# Patient Record
Sex: FEMALE | Race: WHITE | NOT HISPANIC OR LATINO | Employment: FULL TIME | URBAN - NONMETROPOLITAN AREA
[De-identification: names, ages, dates, MRNs, and addresses within clinical notes are randomized per-mention and may not be internally consistent; named-entity substitution may affect disease eponyms.]

---

## 2017-02-06 RX ORDER — ATENOLOL AND CHLORTHALIDONE TABLET 50; 25 MG/1; MG/1
1 TABLET ORAL DAILY
Qty: 7 TABLET | Refills: 0 | Status: SHIPPED | OUTPATIENT
Start: 2017-02-06 | End: 2017-02-15 | Stop reason: SDUPTHER

## 2017-02-06 RX ORDER — AMLODIPINE BESYLATE 10 MG/1
10 TABLET ORAL DAILY
Qty: 7 TABLET | Refills: 0 | Status: SHIPPED | OUTPATIENT
Start: 2017-02-06 | End: 2017-02-15 | Stop reason: SDUPTHER

## 2017-02-08 ENCOUNTER — OFFICE VISIT (OUTPATIENT)
Dept: INTERNAL MEDICINE | Facility: CLINIC | Age: 42
End: 2017-02-08

## 2017-02-08 VITALS
HEIGHT: 69 IN | WEIGHT: 285 LBS | SYSTOLIC BLOOD PRESSURE: 110 MMHG | BODY MASS INDEX: 42.21 KG/M2 | DIASTOLIC BLOOD PRESSURE: 80 MMHG

## 2017-02-08 DIAGNOSIS — E66.01 MORBID OBESITY DUE TO EXCESS CALORIES (HCC): ICD-10-CM

## 2017-02-08 DIAGNOSIS — I10 ESSENTIAL HYPERTENSION: Primary | ICD-10-CM

## 2017-02-08 PROCEDURE — 99213 OFFICE O/P EST LOW 20 MIN: CPT | Performed by: INTERNAL MEDICINE

## 2017-02-08 NOTE — PROGRESS NOTES
Subjective   Marlee Ayala is a 42 y.o. female       Her BP is usually controlled on Norvasc and Atenolol.  She denies any cardiovascular complaints.  Patient is compliant with her medications and takes them as prescribed.  She does not exercise on regular basis.  Patient was unable to lose any weight since last visit.  She would like to try weight loss medication.    Past Medical History   Diagnosis Date   • Abdominal pain      unspecified   • History of Papanicolaou smear of cervix 2005     postpartum satisfactory   • Hypertension    • Obesity      Past Surgical History   Procedure Laterality Date   • Cholecystectomy     • Dilatation and curettage  2000     incomplete        Social History   Substance Use Topics   • Smoking status: Never Smoker   • Smokeless tobacco: Not on file   • Alcohol use Not on file     Family History   Problem Relation Age of Onset   • Diabetes Other    • Hypertension Other    • Prostate cancer Other      No Known Allergies  Current Outpatient Prescriptions on File Prior to Visit   Medication Sig Dispense Refill   • amLODIPine (NORVASC) 10 MG tablet Take 1 tablet by mouth Daily. 7 tablet 0   • atenolol-chlorthalidone (TENORETIC) 50-25 MG per tablet Take 1 tablet by mouth Daily. 7 tablet 0   • [DISCONTINUED] LORazepam (ATIVAN) 0.5 MG tablet        No current facility-administered medications on file prior to visit.      Review of Systems   Constitutional: Negative for fatigue.   Respiratory: Negative for shortness of breath.    Cardiovascular: Negative for chest pain, palpitations and leg swelling.   Gastrointestinal: Negative for abdominal pain, constipation and diarrhea.   Endocrine: Negative for cold intolerance, heat intolerance, polydipsia and polyuria.   Genitourinary: Negative for flank pain and frequency.   Psychiatric/Behavioral: Negative for sleep disturbance. The patient is not nervous/anxious.        Objective   Physical Exam   Constitutional: She appears  well-developed and well-nourished.   HENT:   Head: Normocephalic and atraumatic.   Nose: Nose normal.   Mouth/Throat: Oropharynx is clear and moist.   Eyes: Conjunctivae and EOM are normal.   Neck: Neck supple. No JVD present. No thyromegaly present.   Cardiovascular: Normal rate and regular rhythm.    No murmur heard.  Pulmonary/Chest: Effort normal and breath sounds normal.   Abdominal: Soft. She exhibits no mass.   Musculoskeletal: Normal range of motion.   Psychiatric: She has a normal mood and affect. Her behavior is normal.           Patient Active Problem List   Diagnosis   • Essential hypertension   • Morbid obesity due to excess calories       No visits with results within 6 Month(s) from this visit.  Latest known visit with results is:    Hospital Outpatient Visit on 07/06/2016   Component Date Value Ref Range Status   • Sodium 07/06/2016 139  137 - 145 mmol/L Final   • Potassium 07/06/2016 3.5  3.5 - 5.1 mmol/L Final   • Chloride 07/06/2016 99  95 - 110 mmol/L Final   • CO2 07/06/2016 27  22 - 31 mmol/L Final   • Anion Gap 07/06/2016 13.0  5.0 - 15.0 mmol/L Final   • Glucose 07/06/2016 112* 60 - 100 mg/dl Final   • BUN 07/06/2016 13  7 - 21 mg/dl Final   • Creatinine 07/06/2016 0.6  0.5 - 1.0 mg/dl Final   • GFR MDRD Non  07/06/2016 111  58 - 135 mL/min/1.73 sq.M Final   • GFR MDRD  07/06/2016 134  58 - 135 mL/min/1.73 sq.M Final   • Calcium 07/06/2016 9.1  8.4 - 10.2 mg/dl Final   • Total Protein 07/06/2016 7.2  6.3 - 8.6 gm/dl Final   • Albumin 07/06/2016 3.9  3.4 - 4.8 gm/dl Final   • Total Bilirubin 07/06/2016 0.4  0.2 - 1.3 mg/dl Final   • Alkaline Phosphatase 07/06/2016 50  38 - 126 U/L Final   • AST (SGOT) 07/06/2016 24  14 - 36 U/L Final   • ALT (SGPT) 07/06/2016 43  9 - 52 U/L Final   • Total Cholesterol 07/06/2016 189  0 - 199 mg/dl Final   • Triglycerides 07/06/2016 139  20 - 199 mg/dl Final   • HDL Cholesterol 07/06/2016 40* 60 - 200 mg/dl Final   • LDL  Cholesterol  07/06/2016 121  0 - 129 mg/dl Final   • Hemoglobin A1C 07/06/2016 5.5  4.0 - 5.6 %TotHgb Final             Assessment    Diagnosis Plan   1. Essential hypertension     2. Morbid obesity due to excess calories         Plan      1.  She will continue current therapy with Norvasc and Atenolol.       DASH.      1.5 gr Sodium restriction.  2.  Calories restricted diet discussed with the patient.       Advised to increase physical activity to 30 minutes a day.       She will be tried on Belviq.       Side effects of the medication reviewed with the patient.          Follow up in 1 month

## 2017-02-15 RX ORDER — ATENOLOL AND CHLORTHALIDONE TABLET 50; 25 MG/1; MG/1
1 TABLET ORAL DAILY
Qty: 30 TABLET | Refills: 5 | Status: SHIPPED | OUTPATIENT
Start: 2017-02-15 | End: 2017-09-29 | Stop reason: SDUPTHER

## 2017-02-15 RX ORDER — AMLODIPINE BESYLATE 10 MG/1
10 TABLET ORAL DAILY
Qty: 30 TABLET | Refills: 5 | Status: SHIPPED | OUTPATIENT
Start: 2017-02-15 | End: 2017-09-29 | Stop reason: SDUPTHER

## 2017-09-29 ENCOUNTER — TELEPHONE (OUTPATIENT)
Dept: INTERNAL MEDICINE | Facility: CLINIC | Age: 42
End: 2017-09-29

## 2017-09-29 RX ORDER — ATENOLOL AND CHLORTHALIDONE TABLET 50; 25 MG/1; MG/1
1 TABLET ORAL DAILY
Qty: 30 TABLET | Refills: 0 | Status: SHIPPED | OUTPATIENT
Start: 2017-09-29 | End: 2017-10-23 | Stop reason: SDUPTHER

## 2017-09-29 RX ORDER — AMLODIPINE BESYLATE 10 MG/1
10 TABLET ORAL DAILY
Qty: 30 TABLET | Refills: 0 | Status: SHIPPED | OUTPATIENT
Start: 2017-09-29 | End: 2017-10-23 | Stop reason: SDUPTHER

## 2017-09-29 NOTE — TELEPHONE ENCOUNTER
PATIENT IS WONDERING IF SHE CAN GET ENOUGH MEDICATION TO GET HER THROUGH UNTIL HER APPOINTMENT SHE IS OUT OF MEDICATION. SHE USES [facility]

## 2017-10-23 ENCOUNTER — OFFICE VISIT (OUTPATIENT)
Dept: INTERNAL MEDICINE | Facility: CLINIC | Age: 42
End: 2017-10-23

## 2017-10-23 VITALS
DIASTOLIC BLOOD PRESSURE: 80 MMHG | BODY MASS INDEX: 42 KG/M2 | SYSTOLIC BLOOD PRESSURE: 120 MMHG | WEIGHT: 283.6 LBS | HEIGHT: 69 IN

## 2017-10-23 DIAGNOSIS — I10 ESSENTIAL HYPERTENSION: Primary | ICD-10-CM

## 2017-10-23 DIAGNOSIS — E66.01 MORBID OBESITY WITH BMI OF 40.0-44.9, ADULT (HCC): ICD-10-CM

## 2017-10-23 PROCEDURE — 99213 OFFICE O/P EST LOW 20 MIN: CPT | Performed by: INTERNAL MEDICINE

## 2017-10-23 RX ORDER — ATENOLOL AND CHLORTHALIDONE TABLET 50; 25 MG/1; MG/1
1 TABLET ORAL DAILY
Qty: 30 TABLET | Refills: 5 | Status: SHIPPED | OUTPATIENT
Start: 2017-10-23 | End: 2018-05-21 | Stop reason: SDUPTHER

## 2017-10-23 RX ORDER — AMLODIPINE BESYLATE 10 MG/1
10 TABLET ORAL DAILY
Qty: 30 TABLET | Refills: 5 | Status: SHIPPED | OUTPATIENT
Start: 2017-10-23 | End: 2018-05-21 | Stop reason: SDUPTHER

## 2017-10-23 NOTE — PROGRESS NOTES
Subjective   Marlee Ayala is a 42 y.o. female       Patient is in for recheck on HTN and obesity.    Her BP is well controlled on Norvasc and Atenolol.  She checks BP at work and it is in normal range.  She denies any chest pain, dyspnea or edema in lower extremities.  She was unable to lose any weight since last visit.  Patient was given prescription for Belviq last visit but has not had it filled. She would like to try it this time.    Past Medical History:   Diagnosis Date   • Abdominal pain     unspecified   • History of Papanicolaou smear of cervix 2005    postpartum satisfactory   • Hypertension    • Obesity      Past Surgical History:   Procedure Laterality Date   • CHOLECYSTECTOMY     • DILATATION AND CURETTAGE  2000    incomplete        Social History   Substance Use Topics   • Smoking status: Never Smoker   • Smokeless tobacco: Never Used   • Alcohol use Not on file     Family History   Problem Relation Age of Onset   • Diabetes Other    • Hypertension Other    • Prostate cancer Other      No Known Allergies  Current Outpatient Prescriptions on File Prior to Visit   Medication Sig Dispense Refill   • [DISCONTINUED] amLODIPine (NORVASC) 10 MG tablet Take 1 tablet by mouth Daily. 30 tablet 0   • [DISCONTINUED] atenolol-chlorthalidone (TENORETIC) 50-25 MG per tablet Take 1 tablet by mouth Daily. 30 tablet 0     No current facility-administered medications on file prior to visit.      Review of Systems   Constitutional: Negative for activity change and fatigue.   Respiratory: Negative for chest tightness and shortness of breath.    Gastrointestinal: Negative for abdominal pain, constipation and diarrhea.   Endocrine: Negative for cold intolerance, heat intolerance, polydipsia and polyuria.   Genitourinary: Negative for flank pain and frequency.   Musculoskeletal: Negative for gait problem and joint swelling.   Neurological: Negative for dizziness, light-headedness and headaches.    Psychiatric/Behavioral: Negative for sleep disturbance. The patient is not nervous/anxious.        Objective   Physical Exam   Constitutional: She appears well-developed and well-nourished.   HENT:   Head: Normocephalic and atraumatic.   Eyes: Conjunctivae and EOM are normal. Pupils are equal, round, and reactive to light.   Neck: Neck supple. No JVD present. No thyromegaly present.   Cardiovascular: Normal rate and regular rhythm.    No murmur heard.  Pulmonary/Chest: Effort normal and breath sounds normal.   Abdominal: Soft. Bowel sounds are normal. She exhibits no mass.   Musculoskeletal: Normal range of motion. She exhibits no tenderness.   Neurological: She is alert.   Skin: Skin is warm and dry.   Psychiatric: She has a normal mood and affect. Her behavior is normal.   Nursing note and vitals reviewed.          Patient Active Problem List   Diagnosis   • Essential hypertension   • Morbid obesity due to excess calories               Assessment   Diagnosis Plan   1. Essential hypertension     2. Morbid obesity with BMI of 40.0-44.9, adult  Lipid Panel    Comprehensive Metabolic Panel    Hemoglobin A1c    TSH       Plan        1. Patient will continue Norvasc and Atenolol.      DASH.      1.5 gr Sodium restriction.  2. Patient is counseled on calories restricted diet.      Advised to increase physical activity to 30 minutes 5 days a week.      Patient would like to try Belviq.      Medication benefits and side effects discussed with the patient.      Follow up in 3 months.          This document has been electronically signed by Kalpana Davenport MD on October 23, 2017 4:23 PM

## 2017-10-23 NOTE — PATIENT INSTRUCTIONS
Calorie Counting for Weight Loss  Calories are energy you get from the things you eat and drink. Your body uses this energy to keep you going throughout the day. The number of calories you eat affects your weight. When you eat more calories than your body needs, your body stores the extra calories as fat. When you eat fewer calories than your body needs, your body burns fat to get the energy it needs.  Calorie counting means keeping track of how many calories you eat and drink each day. If you make sure to eat fewer calories than your body needs, you should lose weight. In order for calorie counting to work, you will need to eat the number of calories that are right for you in a day to lose a healthy amount of weight per week. A healthy amount of weight to lose per week is usually 1-2 lb (0.5-0.9 kg). A dietitian can determine how many calories you need in a day and give you suggestions on how to reach your calorie goal.   WHAT IS MY MY PLAN?  My goal is to have __________ calories per day.   If I have this many calories per day, I should lose around __________ pounds per week.  WHAT DO I NEED TO KNOW ABOUT CALORIE COUNTING?  In order to meet your daily calorie goal, you will need to:  · Find out how many calories are in each food you would like to eat. Try to do this before you eat.  · Decide how much of the food you can eat.  · Write down what you ate and how many calories it had. Doing this is called keeping a food log.  WHERE DO I FIND CALORIE INFORMATION?  The number of calories in a food can be found on a Nutrition Facts label. Note that all the information on a label is based on a specific serving of the food. If a food does not have a Nutrition Facts label, try to look up the calories online or ask your dietitian for help.  HOW DO I DECIDE HOW MUCH TO EAT?  To decide how much of the food you can eat, you will need to consider both the number of calories in one serving and the size of one serving. This  information can be found on the Nutrition Facts label. If a food does not have a Nutrition Facts label, look up the information online or ask your dietitian for help.  Remember that calories are listed per serving. If you choose to have more than one serving of a food, you will have to multiply the calories per serving by the amount of servings you plan to eat. For example, the label on a package of bread might say that a serving size is 1 slice and that there are 90 calories in a serving. If you eat 1 slice, you will have eaten 90 calories. If you eat 2 slices, you will have eaten 180 calories.  HOW DO I KEEP A FOOD LOG?  After each meal, record the following information in your food log:  · What you ate.  · How much of it you ate.  · How many calories it had.  · Then, add up your calories.  Keep your food log near you, such as in a small notebook in your pocket. Another option is to use a mobile lelo or website. Some programs will calculate calories for you and show you how many calories you have left each time you add an item to the log.  WHAT ARE SOME CALORIE COUNTING TIPS?  · Use your calories on foods and drinks that will fill you up and not leave you hungry. Some examples of this include foods like nuts and nut butters, vegetables, lean proteins, and high-fiber foods (more than 5 g fiber per serving).  · Eat nutritious foods and avoid empty calories. Empty calories are calories you get from foods or beverages that do not have many nutrients, such as candy and soda. It is better to have a nutritious high-calorie food (such as an avocado) than a food with few nutrients (such as a bag of chips).  · Know how many calories are in the foods you eat most often. This way, you do not have to look up how many calories they have each time you eat them.  · Look out for foods that may seem like low-calorie foods but are really high-calorie foods, such as baked goods, soda, and fat-free candy.  · Pay attention to calories  in drinks. Drinks such as sodas, specialty coffee drinks, alcohol, and juices have a lot of calories yet do not fill you up. Choose low-calorie drinks like water and diet drinks.  · Focus your calorie counting efforts on higher calorie items. Logging the calories in a garden salad that contains only vegetables is less important than calculating the calories in a milk shake.  · Find a way of tracking calories that works for you. Get creative. Most people who are successful find ways to keep track of how much they eat in a day, even if they do not count every calorie.  WHAT ARE SOME PORTION CONTROL TIPS?  · Know how many calories are in a serving. This will help you know how many servings of a certain food you can have.  · Use a measuring cup to measure serving sizes. This is helpful when you start out. With time, you will be able to estimate serving sizes for some foods.  · Take some time to put servings of different foods on your favorite plates, bowls, and cups so you know what a serving looks like.  · Try not to eat straight from a bag or box. Doing this can lead to overeating. Put the amount you would like to eat in a cup or on a plate to make sure you are eating the right portion.  · Use smaller plates, glasses, and bowls to prevent overeating. This is a quick and easy way to practice portion control. If your plate is smaller, less food can fit on it.  · Try not to multitask while eating, such as watching TV or using your computer. If it is time to eat, sit down at a table and enjoy your food. Doing this will help you to start recognizing when you are full. It will also make you more aware of what and how much you are eating.  HOW CAN I CALORIE COUNT WHEN EATING OUT?  · Ask for smaller portion sizes or child-sized portions.  · Consider sharing an entree and sides instead of getting your own entree.  · If you get your own entree, eat only half. Ask for a box at the beginning of your meal and put the rest of your  "entree in it so you are not tempted to eat it.  · Look for the calories on the menu. If calories are listed, choose the lower calorie options.  · Choose dishes that include vegetables, fruits, whole grains, low-fat dairy products, and lean protein. Focusing on smart food choices from each of the 5 food groups can help you stay on track at restaurants.  · Choose items that are boiled, broiled, grilled, or steamed.  · Choose water, milk, unsweetened iced tea, or other drinks without added sugars. If you want an alcoholic beverage, choose a lower calorie option. For example, a regular bucky can have up to 700 calories and a glass of wine has around 150.  · Stay away from items that are buttered, battered, fried, or served with cream sauce. Items labeled \"crispy\" are usually fried, unless stated otherwise.  · Ask for dressings, sauces, and syrups on the side. These are usually very high in calories, so do not eat much of them.  · Watch out for salads. Many people think salads are a healthy option, but this is often not the case. Many salads come with chapin, fried chicken, lots of cheese, fried chips, and dressing. All of these items have a lot of calories. If you want a salad, choose a garden salad and ask for grilled meats or steak. Ask for the dressing on the side, or ask for olive oil and vinegar or lemon to use as dressing.  · Estimate how many servings of a food you are given. For example, a serving of cooked rice is ½ cup or about the size of half a tennis ball or one cupcake wrapper. Knowing serving sizes will help you be aware of how much food you are eating at restaurants. The list below tells you how big or small some common portion sizes are based on everyday objects.    1 oz--4 stacked dice.    3 oz--1 deck of cards.    1 tsp--1 dice.    1 Tbsp--½ a Ping-Pong ball.    2 Tbsp--1 Ping-Pong ball.    ½ cup--1 tennis ball or 1 cupcake wrapper.    1 cup--1 baseball.     This information is not intended to " replace advice given to you by your health care provider. Make sure you discuss any questions you have with your health care provider.     Document Released: 12/18/2006 Document Revised: 01/08/2016 Document Reviewed: 10/23/2014  [employer] Interactive Patient Education ©2017 [employer]

## 2018-05-21 ENCOUNTER — TELEPHONE (OUTPATIENT)
Dept: INTERNAL MEDICINE | Facility: CLINIC | Age: 43
End: 2018-05-21

## 2018-05-21 RX ORDER — AMLODIPINE BESYLATE 10 MG/1
10 TABLET ORAL DAILY
Qty: 2 TABLET | Refills: 0 | Status: SHIPPED | OUTPATIENT
Start: 2018-05-21 | End: 2018-05-22 | Stop reason: SDUPTHER

## 2018-05-21 RX ORDER — ATENOLOL AND CHLORTHALIDONE TABLET 50; 25 MG/1; MG/1
1 TABLET ORAL DAILY
Qty: 2 TABLET | Refills: 0 | Status: SHIPPED | OUTPATIENT
Start: 2018-05-21 | End: 2018-05-22 | Stop reason: SDUPTHER

## 2018-05-22 ENCOUNTER — OFFICE VISIT (OUTPATIENT)
Dept: INTERNAL MEDICINE | Facility: CLINIC | Age: 43
End: 2018-05-22

## 2018-05-22 VITALS
SYSTOLIC BLOOD PRESSURE: 120 MMHG | DIASTOLIC BLOOD PRESSURE: 80 MMHG | BODY MASS INDEX: 42.43 KG/M2 | HEIGHT: 69 IN | WEIGHT: 286.5 LBS

## 2018-05-22 DIAGNOSIS — E66.01 OBESITY, CLASS III, BMI 40-49.9 (MORBID OBESITY) (HCC): ICD-10-CM

## 2018-05-22 DIAGNOSIS — I10 ESSENTIAL HYPERTENSION: Primary | ICD-10-CM

## 2018-05-22 PROCEDURE — 99213 OFFICE O/P EST LOW 20 MIN: CPT | Performed by: INTERNAL MEDICINE

## 2018-05-22 RX ORDER — ATENOLOL AND CHLORTHALIDONE TABLET 50; 25 MG/1; MG/1
1 TABLET ORAL DAILY
Qty: 90 TABLET | Refills: 1 | Status: SHIPPED | OUTPATIENT
Start: 2018-05-22 | End: 2018-08-21 | Stop reason: SDUPTHER

## 2018-05-22 RX ORDER — AMLODIPINE BESYLATE 10 MG/1
10 TABLET ORAL DAILY
Qty: 90 TABLET | Refills: 1 | Status: SHIPPED | OUTPATIENT
Start: 2018-05-22 | End: 2018-08-21 | Stop reason: SDUPTHER

## 2018-08-17 ENCOUNTER — TELEPHONE (OUTPATIENT)
Dept: FAMILY MEDICINE CLINIC | Facility: CLINIC | Age: 43
End: 2018-08-17

## 2018-08-17 ENCOUNTER — APPOINTMENT (OUTPATIENT)
Dept: LAB | Facility: HOSPITAL | Age: 43
End: 2018-08-17

## 2018-08-17 DIAGNOSIS — E87.6 HYPOKALEMIA: Primary | ICD-10-CM

## 2018-08-17 LAB
ALBUMIN SERPL-MCNC: 4.1 G/DL (ref 3.4–4.8)
ALBUMIN/GLOB SERPL: 1.1 G/DL (ref 1.1–1.8)
ALP SERPL-CCNC: 48 U/L (ref 38–126)
ALT SERPL W P-5'-P-CCNC: 51 U/L (ref 9–52)
ANION GAP SERPL CALCULATED.3IONS-SCNC: 9 MMOL/L (ref 5–15)
AST SERPL-CCNC: 38 U/L (ref 14–36)
BILIRUB SERPL-MCNC: 0.6 MG/DL (ref 0.2–1.3)
BUN BLD-MCNC: 17 MG/DL (ref 7–21)
BUN/CREAT SERPL: 19.5 (ref 7–25)
CALCIUM SPEC-SCNC: 9.1 MG/DL (ref 8.4–10.2)
CHLORIDE SERPL-SCNC: 97 MMOL/L (ref 95–110)
CO2 SERPL-SCNC: 32 MMOL/L (ref 22–31)
CREAT BLD-MCNC: 0.87 MG/DL (ref 0.5–1)
GFR SERPL CREATININE-BSD FRML MDRD: 71 ML/MIN/1.73 (ref 58–135)
GLOBULIN UR ELPH-MCNC: 3.9 GM/DL (ref 2.3–3.5)
GLUCOSE BLD-MCNC: 123 MG/DL (ref 60–100)
HBA1C MFR BLD: 5.6 % (ref 4–5.6)
POTASSIUM BLD-SCNC: 2.9 MMOL/L (ref 3.5–5.1)
PROT SERPL-MCNC: 8 G/DL (ref 6.3–8.6)
SODIUM BLD-SCNC: 138 MMOL/L (ref 137–145)
TSH SERPL DL<=0.05 MIU/L-ACNC: 0.92 MIU/ML (ref 0.46–4.68)

## 2018-08-17 PROCEDURE — 83036 HEMOGLOBIN GLYCOSYLATED A1C: CPT | Performed by: INTERNAL MEDICINE

## 2018-08-17 PROCEDURE — 86038 ANTINUCLEAR ANTIBODIES: CPT | Performed by: EMERGENCY MEDICINE

## 2018-08-17 PROCEDURE — 85025 COMPLETE CBC W/AUTO DIFF WBC: CPT | Performed by: EMERGENCY MEDICINE

## 2018-08-17 PROCEDURE — 86140 C-REACTIVE PROTEIN: CPT | Performed by: INTERNAL MEDICINE

## 2018-08-17 PROCEDURE — 84443 ASSAY THYROID STIM HORMONE: CPT | Performed by: INTERNAL MEDICINE

## 2018-08-17 PROCEDURE — 80053 COMPREHEN METABOLIC PANEL: CPT | Performed by: INTERNAL MEDICINE

## 2018-08-17 RX ORDER — POTASSIUM CHLORIDE 1500 MG/1
TABLET, FILM COATED, EXTENDED RELEASE ORAL
Qty: 30 TABLET | Refills: 1 | Status: SHIPPED | OUTPATIENT
Start: 2018-08-17 | End: 2018-08-31 | Stop reason: SDUPTHER

## 2018-08-17 NOTE — TELEPHONE ENCOUNTER
-Patient States Dr. Davenport has already called her and her  is on his way to  the Potassium Script.      ---- Message from Kalpana Davenport MD sent at 8/17/2018  2:00 PM CDT -----  Potassium is vey low  Start kcl 20 meq - 2 po now  Then daily  Recheck potassium on Mo  If she feels bad - she can go to er and get Potasdsium runs IV  Also glucose elev  I ordered [id number]  ----- Message -----  From: Lab, Background User  Sent: 8/17/2018  11:47 AM  To: Kalpana Davenport MD

## 2018-08-20 ENCOUNTER — TELEPHONE (OUTPATIENT)
Dept: FAMILY MEDICINE CLINIC | Facility: CLINIC | Age: 43
End: 2018-08-20

## 2018-08-20 ENCOUNTER — LAB (OUTPATIENT)
Dept: LAB | Facility: HOSPITAL | Age: 43
End: 2018-08-20

## 2018-08-20 DIAGNOSIS — E87.6 HYPOKALEMIA: ICD-10-CM

## 2018-08-20 LAB
MAGNESIUM SERPL-MCNC: 2 MG/DL (ref 1.6–2.3)
POTASSIUM BLD-SCNC: 3.8 MMOL/L (ref 3.5–5.1)

## 2018-08-20 PROCEDURE — 83735 ASSAY OF MAGNESIUM: CPT

## 2018-08-20 PROCEDURE — 84132 ASSAY OF SERUM POTASSIUM: CPT

## 2018-08-20 PROCEDURE — 36415 COLL VENOUS BLD VENIPUNCTURE: CPT

## 2018-08-20 NOTE — PROGRESS NOTES
Per Dr. Davenport, Ms. Ayala has been called with her recent lab results & recommendations.  Continue her current medications and follow-up as planned or sooner if any problems.

## 2018-08-20 NOTE — TELEPHONE ENCOUNTER
Per Dr. Davenport, Ms. Ayala has been called with her recent lab results & recommendations.  Continue her current medications and follow-up as planned or sooner if any problems.    ----- Message from Kalpana Davenport MD sent at 8/20/2018  5:50 PM CDT -----      ----- Message -----  From: Lab, Background User  Sent: 8/20/2018   4:53 PM  To: Kalpaan Davenport MD

## 2018-08-21 ENCOUNTER — OFFICE VISIT (OUTPATIENT)
Dept: INTERNAL MEDICINE | Facility: CLINIC | Age: 43
End: 2018-08-21

## 2018-08-21 VITALS
HEIGHT: 69 IN | WEIGHT: 288.1 LBS | DIASTOLIC BLOOD PRESSURE: 90 MMHG | SYSTOLIC BLOOD PRESSURE: 120 MMHG | BODY MASS INDEX: 42.67 KG/M2

## 2018-08-21 DIAGNOSIS — L03.115 CELLULITIS OF BOTH LOWER EXTREMITIES: Primary | ICD-10-CM

## 2018-08-21 DIAGNOSIS — E66.01 MORBID OBESITY DUE TO EXCESS CALORIES (HCC): ICD-10-CM

## 2018-08-21 DIAGNOSIS — I10 ESSENTIAL HYPERTENSION: ICD-10-CM

## 2018-08-21 DIAGNOSIS — E87.6 HYPOKALEMIA: ICD-10-CM

## 2018-08-21 DIAGNOSIS — L03.116 CELLULITIS OF BOTH LOWER EXTREMITIES: Primary | ICD-10-CM

## 2018-08-21 PROCEDURE — 99214 OFFICE O/P EST MOD 30 MIN: CPT | Performed by: INTERNAL MEDICINE

## 2018-08-21 RX ORDER — AMLODIPINE BESYLATE 10 MG/1
10 TABLET ORAL DAILY
Qty: 90 TABLET | Refills: 1 | Status: SHIPPED | OUTPATIENT
Start: 2018-08-21 | End: 2019-04-12 | Stop reason: SDUPTHER

## 2018-08-21 RX ORDER — ATENOLOL AND CHLORTHALIDONE TABLET 50; 25 MG/1; MG/1
1 TABLET ORAL DAILY
Qty: 90 TABLET | Refills: 1 | Status: SHIPPED | OUTPATIENT
Start: 2018-08-21 | End: 2018-08-31 | Stop reason: SINTOL

## 2018-08-27 ENCOUNTER — APPOINTMENT (OUTPATIENT)
Dept: LAB | Facility: HOSPITAL | Age: 43
End: 2018-08-27

## 2018-08-27 ENCOUNTER — TELEPHONE (OUTPATIENT)
Dept: INTERNAL MEDICINE | Facility: CLINIC | Age: 43
End: 2018-08-27

## 2018-08-27 LAB
ALBUMIN SERPL-MCNC: 4.1 G/DL (ref 3.4–4.8)
ALBUMIN/GLOB SERPL: 1 G/DL (ref 1.1–1.8)
ALP SERPL-CCNC: 54 U/L (ref 38–126)
ALT SERPL W P-5'-P-CCNC: 46 U/L (ref 9–52)
ANION GAP SERPL CALCULATED.3IONS-SCNC: 10 MMOL/L (ref 5–15)
ARTICHOKE IGE QN: 103 MG/DL (ref 1–129)
AST SERPL-CCNC: 35 U/L (ref 14–36)
BILIRUB SERPL-MCNC: 0.5 MG/DL (ref 0.2–1.3)
BILIRUB UR QL STRIP: ABNORMAL
BUN BLD-MCNC: 17 MG/DL (ref 7–21)
BUN/CREAT SERPL: 28.3 (ref 7–25)
CALCIUM SPEC-SCNC: 9.1 MG/DL (ref 8.4–10.2)
CHLORIDE SERPL-SCNC: 100 MMOL/L (ref 95–110)
CHOLEST SERPL-MCNC: 168 MG/DL (ref 0–199)
CLARITY UR: ABNORMAL
CO2 SERPL-SCNC: 28 MMOL/L (ref 22–31)
COLOR UR: YELLOW
CREAT BLD-MCNC: 0.6 MG/DL (ref 0.5–1)
DEPRECATED RDW RBC AUTO: 46.2 FL (ref 36.4–46.3)
ERYTHROCYTE [DISTWIDTH] IN BLOOD BY AUTOMATED COUNT: 14.3 % (ref 11.5–14.5)
GFR SERPL CREATININE-BSD FRML MDRD: 110 ML/MIN/1.73 (ref 58–135)
GLOBULIN UR ELPH-MCNC: 4 GM/DL (ref 2.3–3.5)
GLUCOSE BLD-MCNC: 121 MG/DL (ref 60–100)
GLUCOSE UR STRIP-MCNC: NEGATIVE MG/DL
HBA1C MFR BLD: 5.6 % (ref 4–5.6)
HCT VFR BLD AUTO: 37.1 % (ref 35–45)
HDLC SERPL-MCNC: 41 MG/DL (ref 60–200)
HGB BLD-MCNC: 12.3 G/DL (ref 12–15.5)
HGB UR QL STRIP.AUTO: NEGATIVE
KETONES UR QL STRIP: NEGATIVE
LDLC/HDLC SERPL: 2.6 {RATIO} (ref 0–3.22)
LEUKOCYTE ESTERASE UR QL STRIP.AUTO: NEGATIVE
MCH RBC QN AUTO: 29.4 PG (ref 26.5–34)
MCHC RBC AUTO-ENTMCNC: 33.2 G/DL (ref 31.4–36)
MCV RBC AUTO: 88.5 FL (ref 80–98)
NITRITE UR QL STRIP: NEGATIVE
PH UR STRIP.AUTO: 5.5 [PH] (ref 5–9)
PLATELET # BLD AUTO: 359 10*3/MM3 (ref 150–450)
PMV BLD AUTO: 11 FL (ref 8–12)
POTASSIUM BLD-SCNC: 3.2 MMOL/L (ref 3.5–5.1)
PROT SERPL-MCNC: 8.1 G/DL (ref 6.3–8.6)
PROT UR QL STRIP: NEGATIVE
RBC # BLD AUTO: 4.19 10*6/MM3 (ref 3.77–5.16)
SODIUM BLD-SCNC: 138 MMOL/L (ref 137–145)
SP GR UR STRIP: 1.03 (ref 1–1.03)
TRIGL SERPL-MCNC: 103 MG/DL (ref 20–199)
TSH SERPL DL<=0.05 MIU/L-ACNC: 1.68 MIU/ML (ref 0.46–4.68)
UROBILINOGEN UR QL STRIP: ABNORMAL
WBC NRBC COR # BLD: 8.71 10*3/MM3 (ref 3.2–9.8)

## 2018-08-27 PROCEDURE — 85027 COMPLETE CBC AUTOMATED: CPT | Performed by: INTERNAL MEDICINE

## 2018-08-27 PROCEDURE — 81003 URINALYSIS AUTO W/O SCOPE: CPT | Performed by: INTERNAL MEDICINE

## 2018-08-27 PROCEDURE — 80053 COMPREHEN METABOLIC PANEL: CPT | Performed by: INTERNAL MEDICINE

## 2018-08-27 PROCEDURE — 80061 LIPID PANEL: CPT | Performed by: INTERNAL MEDICINE

## 2018-08-27 PROCEDURE — 84443 ASSAY THYROID STIM HORMONE: CPT | Performed by: INTERNAL MEDICINE

## 2018-08-27 PROCEDURE — 83036 HEMOGLOBIN GLYCOSYLATED A1C: CPT | Performed by: INTERNAL MEDICINE

## 2018-08-27 PROCEDURE — 36415 COLL VENOUS BLD VENIPUNCTURE: CPT | Performed by: INTERNAL MEDICINE

## 2018-08-27 NOTE — TELEPHONE ENCOUNTER
SPOKE WITH  PT LET HER  KNOW POTASSIUM WAS STILL A LITTLE LOW SHE IS CURRENTLY ON 1/2 POTASSIUM PILL DAILY I LET HER KNOW DR ROY WANTS HER TO INCREASE TO 1 WHOLE PILL DAY AND RECHECK ON FRIDAY

## 2018-08-31 ENCOUNTER — LAB (OUTPATIENT)
Dept: LAB | Facility: HOSPITAL | Age: 43
End: 2018-08-31

## 2018-08-31 DIAGNOSIS — E87.6 HYPOKALEMIA: ICD-10-CM

## 2018-08-31 DIAGNOSIS — E87.6 HYPOKALEMIA: Primary | ICD-10-CM

## 2018-08-31 LAB — POTASSIUM BLD-SCNC: 2.8 MMOL/L (ref 3.5–5.1)

## 2018-08-31 PROCEDURE — 84132 ASSAY OF SERUM POTASSIUM: CPT

## 2018-08-31 PROCEDURE — 36415 COLL VENOUS BLD VENIPUNCTURE: CPT

## 2018-08-31 RX ORDER — ATENOLOL 50 MG/1
50 TABLET ORAL 2 TIMES DAILY
Qty: 60 TABLET | Refills: 5 | Status: SHIPPED | OUTPATIENT
Start: 2018-08-31 | End: 2019-04-12 | Stop reason: SDUPTHER

## 2018-08-31 RX ORDER — POTASSIUM CHLORIDE 1500 MG/1
TABLET, FILM COATED, EXTENDED RELEASE ORAL
Qty: 30 TABLET | Refills: 1 | Status: SHIPPED | OUTPATIENT
Start: 2018-08-31 | End: 2019-04-12

## 2018-08-31 RX ORDER — POTASSIUM CHLORIDE 1500 MG/1
TABLET, FILM COATED, EXTENDED RELEASE ORAL
Qty: 30 TABLET | Refills: 1 | Status: SHIPPED | OUTPATIENT
Start: 2018-08-31 | End: 2018-08-31 | Stop reason: SDUPTHER

## 2018-09-04 ENCOUNTER — LAB (OUTPATIENT)
Dept: LAB | Facility: HOSPITAL | Age: 43
End: 2018-09-04

## 2018-09-04 DIAGNOSIS — Z86.39 HISTORY OF LOW POTASSIUM: Primary | ICD-10-CM

## 2018-09-04 DIAGNOSIS — Z86.39 HISTORY OF LOW POTASSIUM: ICD-10-CM

## 2018-09-04 LAB
ANION GAP SERPL CALCULATED.3IONS-SCNC: 8 MMOL/L (ref 5–15)
BUN BLD-MCNC: 15 MG/DL (ref 7–21)
BUN/CREAT SERPL: 22.1 (ref 7–25)
CALCIUM SPEC-SCNC: 8.9 MG/DL (ref 8.4–10.2)
CHLORIDE SERPL-SCNC: 104 MMOL/L (ref 95–110)
CO2 SERPL-SCNC: 27 MMOL/L (ref 22–31)
CREAT BLD-MCNC: 0.68 MG/DL (ref 0.5–1)
GFR SERPL CREATININE-BSD FRML MDRD: 95 ML/MIN/1.73 (ref 58–135)
GLUCOSE BLD-MCNC: 107 MG/DL (ref 60–100)
POTASSIUM BLD-SCNC: 3.6 MMOL/L (ref 3.5–5.1)
SODIUM BLD-SCNC: 139 MMOL/L (ref 137–145)

## 2018-09-04 PROCEDURE — 36415 COLL VENOUS BLD VENIPUNCTURE: CPT

## 2018-09-04 PROCEDURE — 80048 BASIC METABOLIC PNL TOTAL CA: CPT

## 2018-09-07 ENCOUNTER — TELEPHONE (OUTPATIENT)
Dept: FAMILY MEDICINE CLINIC | Facility: CLINIC | Age: 43
End: 2018-09-07

## 2018-09-07 NOTE — TELEPHONE ENCOUNTER
----- Message from Iván Cunningham MD sent at 9/7/2018  2:06 PM CDT -----  He can take a half of the hydrochlorothiazide pill as needed for swelling.  Have her contact Dr. Davenport Monday for further instructions  ----- Message -----  From: Bell Hartman MA  Sent: 9/7/2018   1:53 PM  To: Iván Cunningham MD    Please advise, thank you    ----- Message -----  From: Reny Duncan  Sent: 9/7/2018   1:18 PM  To: JULIAN Mcnair PT. Lashon from 3rd floor called and said that Dr Cunningham is on call and this is a patient of Dr Davenport's. She was on BP and HCTZ, she was taken off HCTZ because her potassium was dropping and started on Potassium. Her ankles are swelling really bad and wants to know if she should take HCTZ again. Issac is gone for the day. Pt's phone is [phone].

## 2018-09-10 ENCOUNTER — TELEPHONE (OUTPATIENT)
Dept: INTERNAL MEDICINE | Facility: CLINIC | Age: 43
End: 2018-09-10

## 2018-09-10 DIAGNOSIS — E87.6 HYPOKALEMIA: ICD-10-CM

## 2018-09-10 DIAGNOSIS — Z51.81 ENCOUNTER FOR MEDICATION MONITORING: Primary | ICD-10-CM

## 2018-09-10 RX ORDER — SPIRONOLACTONE 25 MG/1
25 TABLET ORAL DAILY
Qty: 30 TABLET | Refills: 5 | Status: SHIPPED | OUTPATIENT
Start: 2018-09-10 | End: 2019-04-12

## 2018-09-10 NOTE — TELEPHONE ENCOUNTER
Ms. Ayala has been called with your recommendations.    It is my understanding she is to continue the Atenolol 50 mg twice daily and the Norvasc 10 mg daily and Potassium 20 m,Eq 1 tablet daily??    Please Advise    I told her you did not mentions stopping any medication just to Add the Aldactone 25 mg daily.    Her number to be reached on 9/10/18 is her cell at [phone]

## 2018-09-10 NOTE — TELEPHONE ENCOUNTER
Pt's blood pressure medicine was changed on her last visit due to her losing potassium.  Doc took her off of the water pill part of her BP medicine. Since then her feet are swelling terribly.  She needs to know what to do about this situation.  Please call and advise [phone]

## 2018-09-10 NOTE — TELEPHONE ENCOUNTER
We can put on aldactone 25 mg daily - it is potassium sparing diuretic  Recheck lab in 1 week after starting, lab ordered

## 2018-09-11 ENCOUNTER — TELEPHONE (OUTPATIENT)
Dept: INTERNAL MEDICINE | Facility: CLINIC | Age: 43
End: 2018-09-11

## 2018-09-11 NOTE — TELEPHONE ENCOUNTER
Pt to continue all meds she will add aldactone and continue potassium for 3 days then stop potassium go to lab in 1 week and recheck potassium level per DR ROY

## 2018-09-25 ENCOUNTER — TELEPHONE (OUTPATIENT)
Dept: FAMILY MEDICINE CLINIC | Facility: CLINIC | Age: 43
End: 2018-09-25

## 2018-09-25 ENCOUNTER — TELEPHONE (OUTPATIENT)
Dept: INTERNAL MEDICINE | Facility: CLINIC | Age: 43
End: 2018-09-25

## 2018-09-25 ENCOUNTER — LAB (OUTPATIENT)
Dept: LAB | Facility: HOSPITAL | Age: 43
End: 2018-09-25

## 2018-09-25 DIAGNOSIS — Z51.81 ENCOUNTER FOR MEDICATION MONITORING: ICD-10-CM

## 2018-09-25 DIAGNOSIS — E87.6 HYPOKALEMIA: ICD-10-CM

## 2018-09-25 LAB
ANION GAP SERPL CALCULATED.3IONS-SCNC: 11 MMOL/L (ref 5–15)
BUN BLD-MCNC: 19 MG/DL (ref 7–21)
BUN/CREAT SERPL: 23.5 (ref 7–25)
CALCIUM SPEC-SCNC: 9.5 MG/DL (ref 8.4–10.2)
CHLORIDE SERPL-SCNC: 98 MMOL/L (ref 95–110)
CO2 SERPL-SCNC: 26 MMOL/L (ref 22–31)
CREAT BLD-MCNC: 0.81 MG/DL (ref 0.5–1)
GFR SERPL CREATININE-BSD FRML MDRD: 78 ML/MIN/1.73 (ref 58–135)
GLUCOSE BLD-MCNC: 98 MG/DL (ref 60–100)
POTASSIUM BLD-SCNC: 4.1 MMOL/L (ref 3.5–5.1)
SODIUM BLD-SCNC: 135 MMOL/L (ref 137–145)

## 2018-09-25 PROCEDURE — 36415 COLL VENOUS BLD VENIPUNCTURE: CPT

## 2018-09-25 PROCEDURE — 80048 BASIC METABOLIC PNL TOTAL CA: CPT

## 2018-09-25 NOTE — TELEPHONE ENCOUNTER
Ms. Ayala has been called and advised to come by the lab and have her Potassium rechecked.    She stats she will stop by today.    No Letter Sent

## 2018-09-25 NOTE — PROGRESS NOTES
Pr Dr. Davenport, Ms. Ayala has been called with her recent lab results & recommendations.  Continue her current medications and follow-up as planned or sooner if any problems.

## 2018-09-25 NOTE — TELEPHONE ENCOUNTER
Pr Dr. Davenport, Ms. Ayala has been called with her recent lab results & recommendations.  Continue her current medications and follow-up as planned or sooner if any problems.      ----- Message from Kalpana Davenport MD sent at 9/25/2018  5:10 PM CDT -----  Labs ok  Cont the same meds

## 2019-04-12 ENCOUNTER — OFFICE VISIT (OUTPATIENT)
Dept: FAMILY MEDICINE CLINIC | Facility: CLINIC | Age: 44
End: 2019-04-12

## 2019-04-12 ENCOUNTER — APPOINTMENT (OUTPATIENT)
Dept: LAB | Facility: HOSPITAL | Age: 44
End: 2019-04-12

## 2019-04-12 VITALS
HEART RATE: 88 BPM | WEIGHT: 293 LBS | BODY MASS INDEX: 41.95 KG/M2 | OXYGEN SATURATION: 96 % | HEIGHT: 70 IN | SYSTOLIC BLOOD PRESSURE: 126 MMHG | DIASTOLIC BLOOD PRESSURE: 74 MMHG

## 2019-04-12 DIAGNOSIS — I10 ESSENTIAL HYPERTENSION: Primary | ICD-10-CM

## 2019-04-12 DIAGNOSIS — E66.01 CLASS 3 SEVERE OBESITY DUE TO EXCESS CALORIES WITH SERIOUS COMORBIDITY AND BODY MASS INDEX (BMI) OF 40.0 TO 44.9 IN ADULT (HCC): ICD-10-CM

## 2019-04-12 PROCEDURE — 36415 COLL VENOUS BLD VENIPUNCTURE: CPT | Performed by: FAMILY MEDICINE

## 2019-04-12 PROCEDURE — 85025 COMPLETE CBC W/AUTO DIFF WBC: CPT | Performed by: FAMILY MEDICINE

## 2019-04-12 PROCEDURE — 99213 OFFICE O/P EST LOW 20 MIN: CPT | Performed by: FAMILY MEDICINE

## 2019-04-12 PROCEDURE — 80053 COMPREHEN METABOLIC PANEL: CPT | Performed by: FAMILY MEDICINE

## 2019-04-12 RX ORDER — AMLODIPINE BESYLATE 10 MG/1
10 TABLET ORAL DAILY
Qty: 90 TABLET | Refills: 3 | Status: SHIPPED | OUTPATIENT
Start: 2019-04-12 | End: 2020-06-04 | Stop reason: SDUPTHER

## 2019-04-12 RX ORDER — SPIRONOLACTONE 25 MG/1
25 TABLET ORAL DAILY
Qty: 30 TABLET | Refills: 5 | Status: CANCELLED | OUTPATIENT
Start: 2019-04-12

## 2019-04-12 RX ORDER — ATENOLOL 50 MG/1
50 TABLET ORAL DAILY
Qty: 90 TABLET | Refills: 3 | Status: SHIPPED | OUTPATIENT
Start: 2019-04-12 | End: 2020-06-04 | Stop reason: SDUPTHER

## 2019-04-12 NOTE — PROGRESS NOTES
Subjective:     Marlee Ayala is a 44 y.o. female who presents for follow up for essential hypertension. She is a former patient of Dr. CHAY Davenport.     Hypertension   Patient is a 44 y.o. female who presents for follow-up of hypertension. Her high blood pressure was first noted at doctor's office a few years ago. Home blood pressure readings: not doing. Salt intake and diet: salt added to cooking and fatty foods. Usual weight: 290 lbs. Associated signs and symptoms: peripheral edema (in the summer). Patient denies: blurred vision, chest pain, dyspnea, headache and pulsating in the ears. Use of agents associated with hypertension: none. Medication compliance: taking as prescribed (amlodipine and atenolol).    Preventative:  PHQ-9 Depression Screening  Little interest or pleasure in doing things? 0   Feeling down, depressed, or hopeless? 0   Trouble falling or staying asleep, or sleeping too much? 0   Feeling tired or having little energy? 1   Poor appetite or overeating? 1   Feeling bad about yourself - or that you are a failure or have let yourself or your family down? 0   Trouble concentrating on things, such as reading the newspaper or watching television? 0   Moving or speaking so slowly that other people could have noticed? Or the opposite - being so fidgety or restless that you have been moving around a lot more than usual? 0   Thoughts that you would be better off dead, or of hurting yourself in some way? 0   PHQ-9 Total Score 2   If you checked off any problems, how difficult have these problems made it for you to do your work, take care of things at home, or get along with other people? Not difficult at all     Total Score   Depression Severity  1-4    Minimal depression  5-9    Mild depression  10-14    Moderate depression  15-19    Moderately severe depression  20-27    Severe depression    On osteoporosis therapy? Not Indicated     Past Medical Hx:  Past Medical History:   Diagnosis Date   • Abdominal  pain     unspecified   • Cancer (CMS/HCC)     Maternal: prostate Cancer    • Diabetes (CMS/HCC)     Father    • Family history of blood clots     Maternal    • Gallbladder problem     Gallbladder surgery removal     • Heartburn    • History of Papanicolaou smear of cervix 2005    postpartum satisfactory   • Hypertension    • Obesity    • Seasonal allergies    • Stroke (CMS/HCC)     Father HxStroke        Past Surgical Hx:  Past Surgical History:   Procedure Laterality Date   • CHOLECYSTECTOMY     • DILATATION AND CURETTAGE  2000    incomplete        Health Maintenance:  Health Maintenance   Topic Date Due   • ANNUAL PHYSICAL  1979   • TDAP/TD VACCINES (1 - Tdap) 1995   • PAP SMEAR  2017   • INFLUENZA VACCINE  2019       Current Meds:    Current Outpatient Medications:   •  amLODIPine (NORVASC) 10 MG tablet, Take 1 tablet by mouth Daily., Disp: 90 tablet, Rfl: 1  •  atenolol (TENORMIN) 50 MG tablet, Take 1 tablet by mouth 2 (Two) Times a Day., Disp: 60 tablet, Rfl: 5  •  spironolactone (ALDACTONE) 25 MG tablet, Take 1 tablet by mouth Daily., Disp: 30 tablet, Rfl: 5  •  Liraglutide -Weight Management 18 MG/3ML solution pen-injector, Use as directed, Disp: 5 pen, Rfl: 2    Allergies:  Patient has no known allergies.    Family Hx:  Family History   Problem Relation Age of Onset   • Diabetes Other    • Hypertension Other    • Prostate cancer Other         Social History:  Social History     Socioeconomic History   • Marital status:      Spouse name: Not on file   • Number of children: Not on file   • Years of education: Not on file   • Highest education level: Not on file   Tobacco Use   • Smoking status: Never Smoker   • Smokeless tobacco: Never Used   Substance and Sexual Activity   • Alcohol use: No   • Drug use: No   • Sexual activity: Yes       Review of Systems  Review of Systems   Constitutional: Positive for unexpected weight change (weight gain). Negative  "for chills, diaphoresis and fever.   HENT: Negative for congestion, rhinorrhea, sneezing and sore throat.    Eyes: Negative for discharge and redness.   Respiratory: Negative for cough, shortness of breath and wheezing.    Cardiovascular: Positive for leg swelling (dependent edema in the summer). Negative for chest pain.   Gastrointestinal: Negative for abdominal pain, diarrhea, nausea and vomiting.   Genitourinary: Negative for difficulty urinating, dysuria and hematuria.   Musculoskeletal: Negative for gait problem and joint swelling.   Skin: Negative for rash and wound.   Neurological: Negative for seizures, syncope, light-headedness and headaches.   Psychiatric/Behavioral: Negative for behavioral problems, confusion and sleep disturbance.       Objective:     /74 (BP Location: Right arm, Patient Position: Sitting, Cuff Size: Large Adult)   Pulse 88   Ht 176.5 cm (69.5\")   Wt 136 kg (298 lb 12.8 oz)   LMP 03/31/2019   SpO2 96%   BMI 43.49 kg/m²     Physical Exam   Constitutional: She is oriented to person, place, and time. She appears well-developed and well-nourished. No distress. She is morbidly obese.  HENT:   Head: Normocephalic and atraumatic.   Nose: Nose normal.   Mouth/Throat: Oropharynx is clear and moist.   Eyes: Conjunctivae and EOM are normal. Pupils are equal, round, and reactive to light. No scleral icterus.   Neck: Neck supple. No tracheal deviation present. No thyromegaly present.   Cardiovascular: Normal rate, regular rhythm, normal heart sounds and intact distal pulses.   Pulmonary/Chest: Effort normal and breath sounds normal.   Abdominal: Soft. Bowel sounds are normal. There is no tenderness.   Musculoskeletal: She exhibits no edema or deformity.   Lymphadenopathy:     She has no cervical adenopathy.   Neurological: She is alert and oriented to person, place, and time.   Skin: Skin is warm and dry. Capillary refill takes less than 2 seconds. No rash noted. She is not diaphoretic. "   Psychiatric: She has a normal mood and affect. Her behavior is normal. Judgment and thought content normal.   Vitals reviewed.    Lab Results   Component Value Date    WBC 8.71 08/27/2018    HGB 12.3 08/27/2018    HCT 37.1 08/27/2018    MCV 88.5 08/27/2018     08/27/2018     Lab Results   Component Value Date    GLUCOSE 98 09/25/2018    BUN 19 09/25/2018    CREATININE 0.81 09/25/2018    EGFRIFNONA 78 09/25/2018    BCR 23.5 09/25/2018    K 4.1 09/25/2018    CO2 26.0 09/25/2018    CALCIUM 9.5 09/25/2018    ALBUMIN 4.10 08/27/2018    AST 35 08/27/2018    ALT 46 08/27/2018        Assessment/Plan:     Marlee was seen today for hypertension and weight loss.    Diagnoses and all orders for this visit:    Essential hypertension  -     amLODIPine (NORVASC) 10 MG tablet; Take 1 tablet by mouth Daily.  -     CBC Auto Differential  -     Comprehensive Metabolic Panel  -     atenolol (TENORMIN) 50 MG tablet; Take 1 tablet by mouth Daily.  - Encourage low sodium diet, less than 2 g/day.     Class 3 severe obesity due to excess calories with serious comorbidity and body mass index (BMI) of 40.0 to 44.9 in adult (CMS/HCC)  -Encouraged a low calorie diet.   -Encouraged 30 minutes of moderate intensity activity at least 5 days a week.   -Plan to supplement weight loss with Saxenda, prescribed by former PCP. Reviewed instructions on using auto injectable pen with patient.     Follow-up:     Return in about 2 weeks (around 4/26/2019) for pap smear.    Goals        Patient Stated    • Weight (lb) < 90.7 kg (200 lb) (pt-stated)      Barrier to goal: None.   -Encouraged a low calorie diet.   -Encouraged 30 minutes of moderate intensity activity at least 5 days a week.   -Plan to supplement with Saxenda.            Preventative:  -Patient's Body mass index is 43.49 kg/m². BMI is above normal parameters. Recommendations include: exercise counseling and nutrition counseling.  -Screening pap smear: not up to date. Will address at next  visit.    Vaccines:  Immunization History   Administered Date(s) Administered   • Flu Vaccine High Dose PF 65YR+ 10/01/2015       Tetanus vaccine: not up to date. Will address at next visit.  Annual influenza vaccine: not up to date. Will address next influenza season.  Hep B vaccine: not up to date. Records requested.     RISK SCORE: 3    Signature  Kalyani Lim MD  [facility] Medicine Resident, PGY III        This document has been electronically signed by Kalyani Lim MD on April 12, 2019 3:14 PM

## 2019-04-12 NOTE — PATIENT INSTRUCTIONS

## 2019-04-13 LAB
ALBUMIN SERPL-MCNC: 4.2 G/DL (ref 3.5–5.2)
ALBUMIN/GLOB SERPL: 1.2 G/DL
ALP SERPL-CCNC: 52 U/L (ref 39–117)
ALT SERPL W P-5'-P-CCNC: 23 U/L (ref 1–33)
ANION GAP SERPL CALCULATED.3IONS-SCNC: 14.6 MMOL/L
AST SERPL-CCNC: 12 U/L (ref 1–32)
BASOPHILS # BLD AUTO: 0.03 10*3/MM3 (ref 0–0.2)
BASOPHILS NFR BLD AUTO: 0.3 % (ref 0–1.5)
BILIRUB SERPL-MCNC: 0.2 MG/DL (ref 0.2–1.2)
BUN BLD-MCNC: 17 MG/DL (ref 6–20)
BUN/CREAT SERPL: 21.5 (ref 7–25)
CALCIUM SPEC-SCNC: 9.2 MG/DL (ref 8.6–10.5)
CHLORIDE SERPL-SCNC: 100 MMOL/L (ref 98–107)
CO2 SERPL-SCNC: 24.4 MMOL/L (ref 22–29)
CREAT BLD-MCNC: 0.79 MG/DL (ref 0.57–1)
DEPRECATED RDW RBC AUTO: 50.4 FL (ref 37–54)
EOSINOPHIL # BLD AUTO: 0.2 10*3/MM3 (ref 0–0.4)
EOSINOPHIL NFR BLD AUTO: 2 % (ref 0.3–6.2)
ERYTHROCYTE [DISTWIDTH] IN BLOOD BY AUTOMATED COUNT: 14.7 % (ref 12.3–15.4)
GFR SERPL CREATININE-BSD FRML MDRD: 79 ML/MIN/1.73
GLOBULIN UR ELPH-MCNC: 3.4 GM/DL
GLUCOSE BLD-MCNC: 103 MG/DL (ref 65–99)
HCT VFR BLD AUTO: 38.5 % (ref 34–46.6)
HGB BLD-MCNC: 12 G/DL (ref 12–15.9)
IMM GRANULOCYTES # BLD AUTO: 0.04 10*3/MM3 (ref 0–0.05)
IMM GRANULOCYTES NFR BLD AUTO: 0.4 % (ref 0–0.5)
LYMPHOCYTES # BLD AUTO: 1.97 10*3/MM3 (ref 0.7–3.1)
LYMPHOCYTES NFR BLD AUTO: 19.9 % (ref 19.6–45.3)
MCH RBC QN AUTO: 29.2 PG (ref 26.6–33)
MCHC RBC AUTO-ENTMCNC: 31.2 G/DL (ref 31.5–35.7)
MCV RBC AUTO: 93.7 FL (ref 79–97)
MONOCYTES # BLD AUTO: 0.91 10*3/MM3 (ref 0.1–0.9)
MONOCYTES NFR BLD AUTO: 9.2 % (ref 5–12)
NEUTROPHILS # BLD AUTO: 6.75 10*3/MM3 (ref 1.4–7)
NEUTROPHILS NFR BLD AUTO: 68.2 % (ref 42.7–76)
NRBC BLD AUTO-RTO: 0 /100 WBC (ref 0–0)
PLATELET # BLD AUTO: 371 10*3/MM3 (ref 140–450)
PMV BLD AUTO: 11.4 FL (ref 6–12)
POTASSIUM BLD-SCNC: 3.7 MMOL/L (ref 3.5–5.2)
PROT SERPL-MCNC: 7.6 G/DL (ref 6–8.5)
RBC # BLD AUTO: 4.11 10*6/MM3 (ref 3.77–5.28)
SODIUM BLD-SCNC: 139 MMOL/L (ref 136–145)
WBC NRBC COR # BLD: 9.9 10*3/MM3 (ref 3.4–10.8)

## 2019-04-17 ENCOUNTER — TELEPHONE (OUTPATIENT)
Dept: FAMILY MEDICINE CLINIC | Facility: CLINIC | Age: 44
End: 2019-04-17

## 2019-04-17 NOTE — TELEPHONE ENCOUNTER
Pt called back regarding her saxenda script and the insurance said all that Dr. Lim needs to do is call [phone] to do the PA. And the patient would like a call back from Dr. Lim has done this.      Thank you,      Alejandra

## 2019-04-17 NOTE — TELEPHONE ENCOUNTER
Pt would like a call back regarding her Saxenda script.     Even with the discount code, her script is still going to cost over $1000. She wanted to know if there was something else she could try or if there was another discount code she wasn't aware of.     I told her I would send a message.     Thank you    [phone]

## 2019-04-17 NOTE — TELEPHONE ENCOUNTER
Pt called back regarding her saxenda script and the insurance said all that Dr. Lim needs to do is call [phone] to do the PA. And the patient would like a call back from Dr. Lim has done this.        Thank you,        Alejandra

## 2019-04-19 ENCOUNTER — TELEPHONE (OUTPATIENT)
Dept: FAMILY MEDICINE CLINIC | Facility: CLINIC | Age: 44
End: 2019-04-19

## 2019-04-19 NOTE — TELEPHONE ENCOUNTER
Marlee Ayala is a 44 y.o. morbidly obese female with a concurrent medical history of essential hypertension. Patient has been prescribed Saxenda 0.6 mg subQ daily alongside a low calorie diet and recommendations of 30 minutes of moderate intensity activity at least 5 days a week. Saxenda has been approved by patient's insurance company until August 2019.     Signature  Kalyani Lim MD  [facility] Medicine Resident, PGY III        This document has been electronically signed by Kalyani Lim MD on April 19, 2019 9:21 AM

## 2019-04-19 NOTE — TELEPHONE ENCOUNTER
Called patient and notified of Saxenda approved by her insurance.     BERT Choi MA.    4/19/19

## 2019-05-01 ENCOUNTER — PATIENT MESSAGE (OUTPATIENT)
Dept: FAMILY MEDICINE CLINIC | Facility: CLINIC | Age: 44
End: 2019-05-01

## 2019-05-01 ENCOUNTER — OFFICE VISIT (OUTPATIENT)
Dept: FAMILY MEDICINE CLINIC | Facility: CLINIC | Age: 44
End: 2019-05-01

## 2019-05-01 VITALS
HEIGHT: 70 IN | HEART RATE: 94 BPM | DIASTOLIC BLOOD PRESSURE: 70 MMHG | OXYGEN SATURATION: 99 % | WEIGHT: 293 LBS | SYSTOLIC BLOOD PRESSURE: 132 MMHG | BODY MASS INDEX: 41.95 KG/M2

## 2019-05-01 DIAGNOSIS — E66.01 CLASS 3 SEVERE OBESITY DUE TO EXCESS CALORIES WITH SERIOUS COMORBIDITY AND BODY MASS INDEX (BMI) OF 40.0 TO 44.9 IN ADULT (HCC): ICD-10-CM

## 2019-05-01 DIAGNOSIS — I10 ESSENTIAL HYPERTENSION: Primary | ICD-10-CM

## 2019-05-01 PROCEDURE — 99213 OFFICE O/P EST LOW 20 MIN: CPT | Performed by: FAMILY MEDICINE

## 2019-05-01 NOTE — PATIENT INSTRUCTIONS

## 2019-05-01 NOTE — PROGRESS NOTES
Subjective:     Marlee Ayala is a 44 y.o. female who presents for follow up for essential hypertension.    Hypertension   Patient is a 44 y.o. female who presents for follow-up of hypertension.  Patient was previously prescribed amlodipine 10 mg p.o. daily, atenolol 50 mg p.o. daily, and spironolactone by her former PCP.  Patient's spironolactone was discontinued at her last visit as potassium levels were within normal limits and patient had no further indication for medication.  Patient has not been recording her blood pressure at home.  She reports a generally unhealthy diet, salty and fatty foods.  However with the assistance of Saxenda injections patient reports a decreased appetite and reduced p.o. intake. Associated signs and symptoms: peripheral edema (in the summer). Patient denies: blurred vision, chest pain, dyspnea, headache and pulsating in the ears. Use of agents associated with hypertension: none. Medication compliance: taking as prescribed.    Obesity  Marlee Ayala is a 44 y.o. female who presents for follow up of obesity; patient has a BMI of 43 consistent with morbid obesity.  Additional cardiac risk factors include hypertension and sedentary lifestyle.  She was prescribed Saxenda injections by her former PCP but only started taking the medication last month. She has noted a weight of approximately 3 pounds in the last month.  Patient denies any nausea or vomiting associated with medication.  She has noticed suppression of appetite.  Patient plans to initiate an exercise program.     Preventative:  On osteoporosis therapy? Not Indicated     Past Medical Hx:  Past Medical History:   Diagnosis Date   • Abdominal pain     unspecified   • Cholelithiasis    • Family history of blood clots     Maternal    • Gallbladder problem     Gallbladder surgery removal 2001    • Heartburn    • History of Papanicolaou smear of cervix 06/22/2005    postpartum satisfactory   • Hypertension    • Obesity    •  Seasonal allergies        Past Surgical Hx:  Past Surgical History:   Procedure Laterality Date   • CHOLECYSTECTOMY     • DILATATION AND CURETTAGE  2000    incomplete        Health Maintenance:  Health Maintenance   Topic Date Due   • ANNUAL PHYSICAL  1979   • TDAP/TD VACCINES (1 - Tdap) 1995   • PAP SMEAR  2017   • INFLUENZA VACCINE  2019       Current Meds:    Current Outpatient Medications:   •  amLODIPine (NORVASC) 10 MG tablet, Take 1 tablet by mouth Daily., Disp: 90 tablet, Rfl: 3  •  atenolol (TENORMIN) 50 MG tablet, Take 1 tablet by mouth Daily., Disp: 90 tablet, Rfl: 3  •  Liraglutide -Weight Management 18 MG/3ML solution pen-injector, Use as directed, Disp: 5 pen, Rfl: 2    Allergies:  Patient has no known allergies.    Family Hx:  Family History   Problem Relation Age of Onset   • Diabetes Father    • Stroke Father    • Hypertension Brother    • Prostate cancer Maternal Grandfather         Social History:  Social History     Socioeconomic History   • Marital status:      Spouse name: Not on file   • Number of children: Not on file   • Years of education: Not on file   • Highest education level: Not on file   Tobacco Use   • Smoking status: Never Smoker   • Smokeless tobacco: Never Used   Substance and Sexual Activity   • Alcohol use: No   • Drug use: No   • Sexual activity: Yes       Review of Systems  Review of Systems   Constitutional: Negative for chills, diaphoresis and fever.   HENT: Negative for congestion, rhinorrhea, sneezing and sore throat.    Eyes: Negative for discharge and redness.   Respiratory: Negative for cough, shortness of breath and wheezing.    Cardiovascular: Positive for leg swelling (dependent edema in the summer). Negative for chest pain.   Gastrointestinal: Negative for abdominal pain, diarrhea, nausea and vomiting.   Genitourinary: Negative for difficulty urinating, dysuria and hematuria.   Musculoskeletal: Negative for gait problem  "and joint swelling.   Skin: Negative for rash and wound.   Neurological: Negative for seizures, syncope, light-headedness and headaches.   Psychiatric/Behavioral: Negative for behavioral problems, confusion and sleep disturbance.       Objective:     /70 (BP Location: Left arm, Patient Position: Sitting, Cuff Size: Large Adult)   Pulse 94   Ht 176.5 cm (69.5\")   Wt 134 kg (295 lb 9.6 oz)   LMP 04/28/2019   SpO2 99%   BMI 43.03 kg/m²     Physical Exam   Constitutional: She is oriented to person, place, and time. She appears well-developed and well-nourished. No distress. She is morbidly obese.  HENT:   Head: Normocephalic and atraumatic.   Nose: Nose normal.   Mouth/Throat: Oropharynx is clear and moist.   Eyes: Conjunctivae and EOM are normal. Pupils are equal, round, and reactive to light. No scleral icterus.   Neck: Neck supple. No tracheal deviation present. No thyromegaly present.   Cardiovascular: Normal rate, regular rhythm, normal heart sounds and intact distal pulses.   Pulmonary/Chest: Effort normal and breath sounds normal.   Abdominal: Soft. Bowel sounds are normal. There is no tenderness.   Musculoskeletal: She exhibits no edema or deformity.   Lymphadenopathy:     She has no cervical adenopathy.   Neurological: She is alert and oriented to person, place, and time.   Skin: Skin is warm and dry. Capillary refill takes less than 2 seconds. No rash noted. She is not diaphoretic.   Psychiatric: She has a normal mood and affect. Her behavior is normal. Judgment and thought content normal.   Vitals reviewed.    Lab Results   Component Value Date    WBC 9.90 04/12/2019    HGB 12.0 04/12/2019    HCT 38.5 04/12/2019    MCV 93.7 04/12/2019     04/12/2019     Lab Results   Component Value Date    GLUCOSE 103 (H) 04/12/2019    BUN 17 04/12/2019    CREATININE 0.79 04/12/2019    EGFRIFNONA 79 04/12/2019    BCR 21.5 04/12/2019    K 3.7 04/12/2019    CO2 24.4 04/12/2019    CALCIUM 9.2 04/12/2019    " ALBUMIN 4.20 04/12/2019    AST 12 04/12/2019    ALT 23 04/12/2019        Assessment/Plan:     Marlee was seen today for hypertension.    Diagnoses and all orders for this visit:    Essential hypertension  -Patient was normotensive in the office with a blood pressure of 132/70.  -Patient encouraged to continue antidepressant medication amlodipine and atenolol.  -Encouraged a low-sodium diet of less than 2 g of salt daily.  -Encouraged 30 minutes of moderate intensity activity at least 5 days a week.    Class 3 severe obesity due to excess calories with serious comorbidity and body mass index (BMI) of 40.0 to 44.9 in adult (CMS/[facility])  -Encouraged to continue Saxenda injections with a maximum dose of 3 mg/day  -Encouraged 30 minutes of moderate intensity activity at least 5 days a week.     Follow-up:     Return in about 1 month (around 6/1/2019) for pap smear.    Goals        Patient Stated    • Weight (lb) < 90.7 kg (200 lb) (pt-stated)      Barrier to goal: None.   -Encouraged a low calorie diet.   -Encouraged 30 minutes of moderate intensity activity at least 5 days a week.   -Plan to supplement with Saxenda.            Preventative:  -Patient's Body mass index is 43.03 kg/m². BMI is above normal parameters. Recommendations include: exercise counseling and nutrition counseling.  -Screening pap smear: not up to date. Will address at next visit.    Vaccines:  Immunization History   Administered Date(s) Administered   • Flu Vaccine High Dose PF 65YR+ 10/01/2015       Tetanus vaccine: not up to date. Will address at next visit.  Annual influenza vaccine: not up to date. Will address next influenza season.  Hep B vaccine: not up to date. Records requested.     RISK SCORE: 3    Signature  Kalyani Lim MD  [facility] Family Medicine Resident, PGY III        This document has been electronically signed by Kalyani Lim MD on May 1, 2019 5:20 PM

## 2019-05-02 RX ORDER — BLOOD SUGAR DIAGNOSTIC
1 STRIP MISCELLANEOUS DAILY
Qty: 50 EACH | Refills: 3 | Status: SHIPPED | OUTPATIENT
Start: 2019-05-02 | End: 2019-05-21 | Stop reason: SDUPTHER

## 2019-05-02 NOTE — TELEPHONE ENCOUNTER
"From: Marlee Ayala  To: Kalyani Lim MD  Sent: 5/1/2019 5:40 PM CDT  Subject: Visit Follow-Up Question    The needles I've been using is Novofine 32G Tip x 6mm (1/4\"). Please let me know when prescription has been sent.  "

## 2019-05-02 NOTE — TELEPHONE ENCOUNTER
Saxenda pen needles sent to [facility] pharmacy.     Signature  Kalyani Lim MD  [facility] Family Medicine Resident, PGY III        This document has been electronically signed by Kalyani Lim MD on May 2, 2019 9:01 AM

## 2020-03-18 RX ORDER — GUAIFENESIN 600 MG/1
1200 TABLET, EXTENDED RELEASE ORAL 2 TIMES DAILY
Qty: 60 TABLET | Refills: 0 | Status: SHIPPED | OUTPATIENT
Start: 2020-03-18 | End: 2020-06-04

## 2020-03-20 ENCOUNTER — E-VISIT (OUTPATIENT)
Dept: FAMILY MEDICINE CLINIC | Facility: TELEHEALTH | Age: 45
End: 2020-03-20

## 2020-03-20 DIAGNOSIS — R05.9 COUGH: ICD-10-CM

## 2020-03-20 DIAGNOSIS — H92.03 OTALGIA OF BOTH EARS: ICD-10-CM

## 2020-03-20 DIAGNOSIS — J02.9 SORE THROAT: ICD-10-CM

## 2020-03-20 DIAGNOSIS — J04.0 LARYNGITIS: Primary | ICD-10-CM

## 2020-03-20 PROCEDURE — 99422 OL DIG E/M SVC 11-20 MIN: CPT | Performed by: NURSE PRACTITIONER

## 2020-03-20 RX ORDER — AMOXICILLIN 875 MG/1
875 TABLET, COATED ORAL 2 TIMES DAILY
Qty: 20 TABLET | Refills: 0 | Status: SHIPPED | OUTPATIENT
Start: 2020-03-20 | End: 2020-06-04

## 2020-03-20 NOTE — PROGRESS NOTES
COVID-19 screening--negative      Marlee Ayala    1975  [id number]    I have reviewed the e-Visit questionnaire and patient's answers as well as personally spoken to the patient, my assessment and plan are as follows:    HPI    Patient reports that she has been sick since mid February.  She reports that she has not been on antibiotics.  She reports she recently finished a steroid pack.  She states she has had no voice for over 1 week.  She reports that she has no fever.  She reports that she has nasal congestion, postnasal drainage, her ears hurt, her glands are swollen, and that she has a cough.  She is using an albuterol inhaler as needed.  She is taking Mucinex.  And she is taking the Promethazine DM cough syrup at night.  She denies shortness of breath.    Review of Systems - Negative except nasal congestion, postnasal drainage, ear pain, sore throat, swollen glands and cough.  She denies fever or shortness of breath.      Diagnoses and all orders for this visit:    Laryngitis  -     amoxicillin (AMOXIL) 875 MG tablet; Take 1 tablet by mouth 2 (Two) Times a Day.    Otalgia of both ears  -     amoxicillin (AMOXIL) 875 MG tablet; Take 1 tablet by mouth 2 (Two) Times a Day.    Sore throat  -     amoxicillin (AMOXIL) 875 MG tablet; Take 1 tablet by mouth 2 (Two) Times a Day.    Cough    Continue the guaifenesin and the nighttime cough syrup as needed.  Continue using the albuterol inhaler as needed.  Increase fluids.  May use saline nasal spray for congestion.  Coricidin HBP should help with symptom relief and is safe to take with high blood pressure.  Antibiotic as prescribed until gone.  Follow-up with your primary care doctor if no improvement in 7 to 10 days.    Any medications prescribed have been sent electronically to   [facility] - [place], [place] - [address] [facility] [phone]  - [phone]   [address] [address]  Phone: [phone] Fax: [phone]    [id number] - [place], [place] - [address] [place] [address] AT [facility] - [phone]  - [phone]   [address] [facility] [address]  Phone: [phone] Fax: [phone]        TRISH Hollis  03/20/20  10:16 AM

## 2020-03-20 NOTE — PATIENT INSTRUCTIONS
Continue the guaifenesin and the nighttime cough syrup as needed.  Continue using the albuterol inhaler as needed.  Increase fluids.  May use saline nasal spray for congestion.  Coricidin HBP should help with symptom relief and is safe to take with high blood pressure.  Antibiotic as prescribed until gone.  Follow-up with your primary care doctor if no improvement in 7 to 10 days.

## 2020-06-04 ENCOUNTER — OFFICE VISIT (OUTPATIENT)
Dept: FAMILY MEDICINE CLINIC | Facility: CLINIC | Age: 45
End: 2020-06-04

## 2020-06-04 ENCOUNTER — TELEPHONE (OUTPATIENT)
Dept: FAMILY MEDICINE CLINIC | Facility: CLINIC | Age: 45
End: 2020-06-04

## 2020-06-04 VITALS — HEIGHT: 69 IN | BODY MASS INDEX: 42.95 KG/M2 | WEIGHT: 290 LBS

## 2020-06-04 DIAGNOSIS — I10 ESSENTIAL HYPERTENSION: ICD-10-CM

## 2020-06-04 DIAGNOSIS — Z12.4 PAP SMEAR FOR CERVICAL CANCER SCREENING: ICD-10-CM

## 2020-06-04 DIAGNOSIS — I10 ESSENTIAL HYPERTENSION: Primary | ICD-10-CM

## 2020-06-04 PROCEDURE — 99441 PR PHYS/QHP TELEPHONE EVALUATION 5-10 MIN: CPT | Performed by: STUDENT IN AN ORGANIZED HEALTH CARE EDUCATION/TRAINING PROGRAM

## 2020-06-04 RX ORDER — AMLODIPINE BESYLATE 10 MG/1
10 TABLET ORAL DAILY
Qty: 90 TABLET | Refills: 3 | Status: CANCELLED | OUTPATIENT
Start: 2020-06-04

## 2020-06-04 RX ORDER — ATENOLOL 50 MG/1
50 TABLET ORAL DAILY
Qty: 20 TABLET | Refills: 0 | Status: SHIPPED | OUTPATIENT
Start: 2020-06-04 | End: 2020-06-19 | Stop reason: SDUPTHER

## 2020-06-04 RX ORDER — AMLODIPINE BESYLATE 10 MG/1
10 TABLET ORAL DAILY
Qty: 20 TABLET | Refills: 0 | Status: SHIPPED | OUTPATIENT
Start: 2020-06-04 | End: 2020-06-19 | Stop reason: ALTCHOICE

## 2020-06-04 RX ORDER — ATENOLOL 50 MG/1
50 TABLET ORAL DAILY
Qty: 90 TABLET | Refills: 3 | Status: CANCELLED | OUTPATIENT
Start: 2020-06-04

## 2020-06-04 NOTE — PROGRESS NOTES
Family Medicine Residency  Master Mendez MD    Subjective:     You have chosen to receive care through a telephone visit. Do you consent to use a telephone visit for your medical care today? Yes    Marlee Ayala is a 45 y.o. female who presents for follow up hypertension.    She was previously a patient of Dr. Davenport who started her on amlodipine and atenolol for essential hypertension. BP at home 150/78 recently. Does not own a home BP measuring device. Complains of leg swelling.      Denies headaches, vision changes, chest pain, or shortness of air in recent memory.    She is additionally due for pap smear and [facility]    No other compliants.    The following portions of the patient's history were reviewed and updated as appropriate: allergies, current medications, past family history, past medical history, past social history, past surgical history and problem list.    Past Medical Hx:  Past Medical History:   Diagnosis Date   • Abdominal pain     unspecified   • Cholelithiasis    • Family history of blood clots     Maternal    • Gallbladder problem     Gallbladder surgery removal     • Heartburn    • History of Papanicolaou smear of cervix 2005    postpartum satisfactory   • Hypertension    • Obesity    • Seasonal allergies        Past Surgical Hx:  Past Surgical History:   Procedure Laterality Date   • CHOLECYSTECTOMY     • DILATATION AND CURETTAGE  2000    incomplete        Current Meds:    Current Outpatient Medications:   •  albuterol sulfate HFA (VENTOLIN HFA) 108 (90 Base) MCG/ACT inhaler, Inhale 2 puffs Every 4 (Four) Hours As Needed for Wheezing., Disp: 1 inhaler, Rfl: 0  •  amLODIPine (NORVASC) 10 MG tablet, Take 1 tablet by mouth Daily., Disp: 20 tablet, Rfl: 0  •  atenolol (Tenormin) 50 MG tablet, Take 1 tablet by mouth Daily., Disp: 20 tablet, Rfl: 0  •  Insulin Pen Needle 32G X 6 MM misc, 1 Units Daily., Disp: 100 each, Rfl: 0  •   "Liraglutide -Weight Management 18 MG/3ML solution pen-injector, Use as directed, Disp: 5 pen, Rfl: 2    Allergies:  No Known Allergies    Family Hx:  Family History   Problem Relation Age of Onset   • Diabetes Father    • Stroke Father    • Hypertension Brother    • Prostate cancer Maternal Grandfather         Social History:  Social History     Socioeconomic History   • Marital status:      Spouse name: Not on file   • Number of children: Not on file   • Years of education: Not on file   • Highest education level: Not on file   Tobacco Use   • Smoking status: Never Smoker   • Smokeless tobacco: Never Used   Substance and Sexual Activity   • Alcohol use: No   • Drug use: No   • Sexual activity: Yes       Review of Systems  Review of Systems   Constitutional: Negative for chills and fever.   HENT: Negative for congestion and sore throat.    Eyes: Negative for pain and visual disturbance.   Respiratory: Negative for cough and shortness of breath.    Cardiovascular: Negative for chest pain and palpitations.   Gastrointestinal: Negative for abdominal pain and nausea.   Genitourinary: Negative for dysuria and flank pain.   Musculoskeletal: Negative for back pain and gait problem.   Skin: Negative for pallor and rash.   Neurological: Negative for dizziness and headaches.   Psychiatric/Behavioral: Negative for confusion and dysphoric mood.       Objective:     Ht 175.3 cm (69\")   Wt 132 kg (290 lb)   BMI 42.83 kg/m²   Physical Exam     Assessment/Plan:     Marlee was seen today for hypertension.    Diagnoses and all orders for this visit:    Essential hypertension  -     atenolol (Tenormin) 50 MG tablet; Take 1 tablet by mouth Daily.  -     amLODIPine (NORVASC) 10 MG tablet; Take 1 tablet by mouth Daily.    Pap smear for cervical cancer screening  -     Ambulatory Referral to Gynecology    Other orders  -     Cancel: Ambulatory Referral to Psychology    Stable, not well controlled. Advised patient to purchase a  BP " measuring divide and take daily measures. She is currently no on first line treatment. Will schedule her to come in 2 weeks to establish care and revise her BP regimen. Advised that if she experiences headache, chest pain, shortness of air, or vision changes and measured SBP  > 160 or DBP > 95 to go to ED for evaluation as she is at risk of MI or stroke.     · Rx changes: n/a  · Patient Education: n/a  · Compliance at present is estimated to be fair.   · Efforts to improve compliance (if necessary) will be directed at increased exercise.     Follow-up:     Return for BP and to establish care .    Preventative:  Health Maintenance   Topic Date Due   • ANNUAL PHYSICAL  02/03/1979   • HEPATITIS C SCREENING  02/06/2017   • PAP SMEAR  02/06/2017   • INFLUENZA VACCINE  08/01/2020   • TDAP/TD VACCINES (2 - Td) 10/22/2020     Female Preventative: PAP is due - [facility]  Recommended: none  Vaccine Counseling: N/A    Weight  -Class: Obese Class III extreme obesity: > or equal to 40kg/m2  -Patient's Body mass index is 42.83 kg/m². BMI is above normal parameters. Recommendations include: exercise counseling, nutrition counseling and consider referral to bariatric surgery for evaluation and/or medically supervised weight loss program.   eat more fruits and vegetables, decrease soda or juice intake, increase water intake, increase physical activity, reduce portion size, reduce fast food intake, plan meals and have 3 meals a day    Alcohol use:  reports that she does not drink alcohol.  Nicotine status  reports that she has never smoked. She has never used smokeless tobacco.    Goals        Patient Stated    • Weight (lb) < 90.7 kg (200 lb) (pt-stated)      Barrier to goal: None.   -Encouraged a low calorie diet.   -Encouraged 30 minutes of moderate intensity activity at least 5 days a week.   -Plan to supplement with Saxenda.            RISK SCORE: 3      Master Mendez M.D. PGY2  [facility]  [place] [facility]  [address] [place], [place] [id number]  Office: [phone]    This document has been electronically signed by Master Mendez MD on June 4, 2020 17:10      TIME: 10 minutes

## 2020-06-08 NOTE — PROGRESS NOTES
I have reviewed the notes, assessments, and/or procedures performed by Dr. Mendez, I concur with her/his documentation and assessment and plan for Marlee Ayala.                This document has been electronically signed by Jeferson Thapa MD on June 8, 2020 14:33

## 2020-06-19 ENCOUNTER — LAB (OUTPATIENT)
Dept: LAB | Facility: HOSPITAL | Age: 45
End: 2020-06-19

## 2020-06-19 ENCOUNTER — OFFICE VISIT (OUTPATIENT)
Dept: FAMILY MEDICINE CLINIC | Facility: CLINIC | Age: 45
End: 2020-06-19

## 2020-06-19 VITALS
HEART RATE: 73 BPM | SYSTOLIC BLOOD PRESSURE: 126 MMHG | TEMPERATURE: 97.2 F | WEIGHT: 293 LBS | RESPIRATION RATE: 20 BRPM | OXYGEN SATURATION: 97 % | DIASTOLIC BLOOD PRESSURE: 78 MMHG | HEIGHT: 69 IN | BODY MASS INDEX: 43.4 KG/M2

## 2020-06-19 DIAGNOSIS — F43.21 GRIEF: ICD-10-CM

## 2020-06-19 DIAGNOSIS — Z12.4 PAPANICOLAOU SMEAR: ICD-10-CM

## 2020-06-19 DIAGNOSIS — E66.01 CLASS 3 SEVERE OBESITY DUE TO EXCESS CALORIES WITH SERIOUS COMORBIDITY AND BODY MASS INDEX (BMI) OF 45.0 TO 49.9 IN ADULT (HCC): ICD-10-CM

## 2020-06-19 DIAGNOSIS — I10 ESSENTIAL HYPERTENSION: Primary | ICD-10-CM

## 2020-06-19 LAB
ALBUMIN SERPL-MCNC: 4.3 G/DL (ref 3.5–5.2)
ALBUMIN/GLOB SERPL: 1.4 G/DL
ALP SERPL-CCNC: 51 U/L (ref 39–117)
ALT SERPL W P-5'-P-CCNC: 31 U/L (ref 1–33)
ANION GAP SERPL CALCULATED.3IONS-SCNC: 10.6 MMOL/L (ref 5–15)
AST SERPL-CCNC: 18 U/L (ref 1–32)
BASOPHILS # BLD AUTO: 0.04 10*3/MM3 (ref 0–0.2)
BASOPHILS NFR BLD AUTO: 0.5 % (ref 0–1.5)
BILIRUB SERPL-MCNC: 0.3 MG/DL (ref 0.2–1.2)
BUN BLD-MCNC: 14 MG/DL (ref 6–20)
BUN/CREAT SERPL: 21.9 (ref 7–25)
CALCIUM SPEC-SCNC: 9.2 MG/DL (ref 8.6–10.5)
CHLORIDE SERPL-SCNC: 99 MMOL/L (ref 98–107)
CHOLEST SERPL-MCNC: 159 MG/DL (ref 0–200)
CO2 SERPL-SCNC: 24.4 MMOL/L (ref 22–29)
CREAT BLD-MCNC: 0.64 MG/DL (ref 0.57–1)
DEPRECATED RDW RBC AUTO: 44.6 FL (ref 37–54)
EOSINOPHIL # BLD AUTO: 0.23 10*3/MM3 (ref 0–0.4)
EOSINOPHIL NFR BLD AUTO: 2.7 % (ref 0.3–6.2)
ERYTHROCYTE [DISTWIDTH] IN BLOOD BY AUTOMATED COUNT: 13.3 % (ref 12.3–15.4)
GFR SERPL CREATININE-BSD FRML MDRD: 101 ML/MIN/1.73
GLOBULIN UR ELPH-MCNC: 3 GM/DL
GLUCOSE BLD-MCNC: 119 MG/DL (ref 65–99)
HBA1C MFR BLD: 5.7 % (ref 4.8–5.6)
HCT VFR BLD AUTO: 37.2 % (ref 34–46.6)
HDLC SERPL-MCNC: 46 MG/DL (ref 40–60)
HGB BLD-MCNC: 12.5 G/DL (ref 12–15.9)
IMM GRANULOCYTES # BLD AUTO: 0.04 10*3/MM3 (ref 0–0.05)
IMM GRANULOCYTES NFR BLD AUTO: 0.5 % (ref 0–0.5)
LDLC SERPL CALC-MCNC: 88 MG/DL (ref 0–100)
LDLC/HDLC SERPL: 1.91 {RATIO}
LYMPHOCYTES # BLD AUTO: 1.38 10*3/MM3 (ref 0.7–3.1)
LYMPHOCYTES NFR BLD AUTO: 16.2 % (ref 19.6–45.3)
MCH RBC QN AUTO: 30.6 PG (ref 26.6–33)
MCHC RBC AUTO-ENTMCNC: 33.6 G/DL (ref 31.5–35.7)
MCV RBC AUTO: 91 FL (ref 79–97)
MONOCYTES # BLD AUTO: 0.67 10*3/MM3 (ref 0.1–0.9)
MONOCYTES NFR BLD AUTO: 7.9 % (ref 5–12)
NEUTROPHILS # BLD AUTO: 6.17 10*3/MM3 (ref 1.7–7)
NEUTROPHILS NFR BLD AUTO: 72.2 % (ref 42.7–76)
NRBC BLD AUTO-RTO: 0 /100 WBC (ref 0–0.2)
PLATELET # BLD AUTO: 344 10*3/MM3 (ref 140–450)
PMV BLD AUTO: 11.5 FL (ref 6–12)
POTASSIUM BLD-SCNC: 4 MMOL/L (ref 3.5–5.2)
PROT SERPL-MCNC: 7.3 G/DL (ref 6–8.5)
RBC # BLD AUTO: 4.09 10*6/MM3 (ref 3.77–5.28)
SODIUM BLD-SCNC: 134 MMOL/L (ref 136–145)
T4 FREE SERPL-MCNC: 0.98 NG/DL (ref 0.93–1.7)
TRIGL SERPL-MCNC: 126 MG/DL (ref 0–150)
TSH SERPL DL<=0.05 MIU/L-ACNC: 1.06 UIU/ML (ref 0.27–4.2)
VLDLC SERPL-MCNC: 25.2 MG/DL (ref 5–40)
WBC NRBC COR # BLD: 8.53 10*3/MM3 (ref 3.4–10.8)

## 2020-06-19 PROCEDURE — 36415 COLL VENOUS BLD VENIPUNCTURE: CPT

## 2020-06-19 PROCEDURE — 80061 LIPID PANEL: CPT

## 2020-06-19 PROCEDURE — 80053 COMPREHEN METABOLIC PANEL: CPT

## 2020-06-19 PROCEDURE — 84443 ASSAY THYROID STIM HORMONE: CPT

## 2020-06-19 PROCEDURE — 85025 COMPLETE CBC W/AUTO DIFF WBC: CPT

## 2020-06-19 PROCEDURE — 83036 HEMOGLOBIN GLYCOSYLATED A1C: CPT

## 2020-06-19 PROCEDURE — 99213 OFFICE O/P EST LOW 20 MIN: CPT | Performed by: STUDENT IN AN ORGANIZED HEALTH CARE EDUCATION/TRAINING PROGRAM

## 2020-06-19 PROCEDURE — 84439 ASSAY OF FREE THYROXINE: CPT

## 2020-06-19 RX ORDER — ATENOLOL 50 MG/1
50 TABLET ORAL DAILY
Qty: 20 TABLET | Refills: 0 | Status: CANCELLED | OUTPATIENT
Start: 2020-06-19

## 2020-06-19 RX ORDER — AMLODIPINE BESYLATE 10 MG/1
10 TABLET ORAL DAILY
Qty: 20 TABLET | Refills: 0 | Status: CANCELLED | OUTPATIENT
Start: 2020-06-19

## 2020-06-19 RX ORDER — LISINOPRIL 10 MG/1
10 TABLET ORAL DAILY
Qty: 30 TABLET | Refills: 1 | Status: SHIPPED | OUTPATIENT
Start: 2020-06-19 | End: 2020-07-21 | Stop reason: SDUPTHER

## 2020-06-19 RX ORDER — LORAZEPAM 0.5 MG/1
0.5 TABLET ORAL EVERY 8 HOURS PRN
Qty: 15 TABLET | Refills: 0 | Status: SHIPPED | OUTPATIENT
Start: 2020-06-19 | End: 2020-07-24

## 2020-06-19 RX ORDER — ATENOLOL 50 MG/1
50 TABLET ORAL DAILY
Qty: 30 TABLET | Refills: 1 | Status: SHIPPED | OUTPATIENT
Start: 2020-06-19 | End: 2020-07-24 | Stop reason: SDUPTHER

## 2020-06-19 NOTE — PROGRESS NOTES
Family Medicine Residency  Master Mendez MD    Subjective:     Marlee Ayala is a 45 y.o. female who presents for hypertension follow up and to establish care.    She is managed well with amlodipine 10 mg and atenolol 50 mg daily. She does complain of some leg swelling symmetric.   Denies headaches, vision changes, chest pain, shortness air in recent memory.     She is also interested in evaluation by bariatric weight loss service. She has previously tried weight loss and even used Saxenda to help.     She is due for pap smear and [facility]     Recently suffered death of close family member. 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 She is having trouble with grief and emotions in trial and would like help.     No other complaints.       The following portions of the patient's history were reviewed and updated as appropriate: allergies, current medications, past family history, past medical history, past social history, past surgical history and problem list.    Past Medical Hx:  Past Medical History:   Diagnosis Date   • Abdominal pain     unspecified   • Cholelithiasis    • Family history of blood clots     Maternal    • Gallbladder problem     Gallbladder surgery removal     • Heartburn    • History of Papanicolaou smear of cervix 2005    postpartum satisfactory   • Hypertension    • Obesity    • Seasonal allergies        Past Surgical Hx:  Past Surgical History:   Procedure Laterality Date   • CHOLECYSTECTOMY     • DILATATION AND CURETTAGE  2000    incomplete        Current Meds:    Current Outpatient Medications:   •  albuterol sulfate HFA (VENTOLIN HFA) 108 (90 Base) MCG/ACT inhaler, Inhale 2 puffs Every 4 (Four) Hours As Needed for Wheezing., Disp: 1 inhaler, Rfl: 0  •  atenolol (Tenormin) 50 MG tablet, Take 1 tablet by mouth Daily., Disp: 30 tablet, Rfl: 1  •  Insulin Pen Needle 32G X 6  MM misc, 1 Units Daily., Disp: 100 each, Rfl: 0  •  Liraglutide -Weight Management 18 MG/3ML solution pen-injector, Use as directed, Disp: 5 pen, Rfl: 2  •  lisinopril (PRINIVIL,ZESTRIL) 10 MG tablet, Take 1 tablet by mouth Daily., Disp: 30 tablet, Rfl: 1  •  LORazepam (Ativan) 0.5 MG tablet, Take 1 tablet by mouth Every 8 (Eight) Hours As Needed for Anxiety., Disp: 15 tablet, Rfl: 0    Allergies:  No Known Allergies    Family Hx:  Family History   Problem Relation Age of Onset   • Diabetes Father    • Stroke Father    • Hypertension Brother    • Prostate cancer Maternal Grandfather         Social History:  Social History     Socioeconomic History   • Marital status:      Spouse name: Not on file   • Number of children: Not on file   • Years of education: Not on file   • Highest education level: Not on file   Tobacco Use   • Smoking status: Never Smoker   • Smokeless tobacco: Never Used   Substance and Sexual Activity   • Alcohol use: No   • Drug use: No   • Sexual activity: Yes       Review of Systems  Review of Systems   Constitutional: Negative for appetite change, diaphoresis, fatigue and fever.   HENT: Negative for ear pain, postnasal drip, rhinorrhea and sore throat.    Eyes: Negative for pain and visual disturbance.   Respiratory: Negative for cough, chest tightness and shortness of breath.    Cardiovascular: Negative for chest pain, palpitations and leg swelling.   Gastrointestinal: Negative for abdominal pain, constipation, diarrhea, nausea and vomiting.   Genitourinary: Negative for dysuria, flank pain, frequency and urgency.   Musculoskeletal: Negative for arthralgias, back pain and myalgias.   Skin: Negative for pallor and rash.   Neurological: Negative for dizziness, seizures, syncope, weakness and numbness.   Psychiatric/Behavioral: Negative for agitation, confusion, decreased concentration and dysphoric mood.       Objective:     /78 (BP Location: Right arm, Patient Position: Sitting,  "Cuff Size: Large Adult)   Pulse 73   Temp 97.2 °F (36.2 °C) (Tympanic)   Resp 20   Ht 175.3 cm (69\")   Wt (!) 140 kg (308 lb 3.2 oz)   LMP 06/04/2020   SpO2 97%   BMI 45.51 kg/m²   Physical Exam   Constitutional: She is oriented to person, place, and time. She appears well-developed and well-nourished.   HENT:   Head: Normocephalic and atraumatic.   Right Ear: External ear normal.   Left Ear: External ear normal.   Nose: Nose normal.   Eyes: Pupils are equal, round, and reactive to light. Conjunctivae and EOM are normal.   Neck: Normal range of motion. Neck supple. No tracheal deviation present. No thyromegaly present.   Cardiovascular: Normal rate, regular rhythm, normal heart sounds and intact distal pulses.   Pulmonary/Chest: Effort normal and breath sounds normal. No respiratory distress. She has no wheezes.   Abdominal: Soft. Bowel sounds are normal. She exhibits no distension. There is no tenderness.   Musculoskeletal: Normal range of motion. She exhibits edema (trace b/l to knees). She exhibits no tenderness.   Neurological: She is alert and oriented to person, place, and time. She displays normal reflexes.   Skin: Skin is warm and dry. Capillary refill takes less than 2 seconds.   Psychiatric: She has a normal mood and affect. Her behavior is normal.        Assessment/Plan:     Marlee was seen today for hypertension.    Diagnoses and all orders for this visit:    Essential hypertension  Stable, well controlled. Will transition off amlodipine to ACEI to mitigate LE swelling side effect of CCBs. Advised to purchase BP cuff with XL arm cuff and measure BP daily and also if ever feels HA, CP, SOA, or vision changes and if SBP > 170 and/or DBP >90 with these symptoms to go to ED as she is at risk of stroke or MI.  -     lisinopril (PRINIVIL,ZESTRIL) 10 MG tablet; Take 1 tablet by mouth Daily.  -     atenolol (Tenormin) 50 MG tablet; Take 1 tablet by mouth Daily.  -     Ambulatory Referral to Bariatric " Surgery    Class 3 severe obesity due to excess calories with serious comorbidity and body mass index (BMI) of 45.0 to 49.9 in adult (CMS/[facility])  Stable, poorly controlled. History of diet trials and use of Saxenda for weight loss aid. She is interested in weight loss options including surgical. Will refer   -     Comprehensive Metabolic Panel; Future  -     CBC & Differential; Future  -     TSH+Free T4; Future  -     Hemoglobin A1c; Future  -     Lipid panel; Future  -     Ambulatory Referral to Bariatric Surgery    Grief  Will prescribe a brief course of benzo to aid in grief. Counseled patient that this would not be a recurring prescription and only for the short term which she was understanding and agreeable to.   -     LORazepam (Ativan) 0.5 MG tablet; Take 1 tablet by mouth Every 8 (Eight) Hours As Needed for Anxiety.    Papanicolaou smear  [facility]  -     Ambulatory Referral to Gynecology        · Rx changes: CCB to ACEI  · Patient Education: diet, exercise  · Compliance at present is estimated to be good.   · Efforts to improve compliance (if necessary) will be directed at increased exercise.     Follow-up:     Return in about 1 month (around 7/19/2020) for Recheck.    Preventative:  Health Maintenance   Topic Date Due   • ANNUAL PHYSICAL  02/03/1979   • HEPATITIS C SCREENING  02/06/2017   • PAP SMEAR  02/06/2017   • INFLUENZA VACCINE  08/01/2020   • TDAP/TD VACCINES (2 - Td) 10/22/2020     Female Preventative: PAP is due - will refer to GYN  Recommended: none  Vaccine Counseling: N/A    Weight  -Class: Obese Class III extreme obesity: > or equal to 40kg/m2  -Patient's Body mass index is 45.51 kg/m². BMI is above normal parameters. Recommendations include: exercise counseling, nutrition counseling and referral to a weight management program.   eat more fruits and vegetables, decrease soda or juice intake, increase water intake, increase physical activity, cut out extra  servings, reduce fast food intake, plan meals, eat breakfast and have 3 meals a day    Alcohol use:  reports that she does not drink alcohol.  Nicotine status  reports that she has never smoked. She has never used smokeless tobacco.    Goals        Patient Stated    • Weight (lb) < 90.7 kg (200 lb) (pt-stated)      Barrier to goal: None.   -Encouraged a low calorie diet.   -Encouraged 30 minutes of moderate intensity activity at least 5 days a week.   -Plan to supplement with Saxenda.            RISK SCORE: 4      Master Mendez M.D. PGY2  [facility]  [address] [place] [address]  Office: [phone]    This document has been electronically signed by Master Mendez MD on June 30, 2020 08:53

## 2020-07-06 ENCOUNTER — TELEPHONE (OUTPATIENT)
Dept: BARIATRICS/WEIGHT MGMT | Facility: CLINIC | Age: 45
End: 2020-07-06

## 2020-07-20 DIAGNOSIS — I10 ESSENTIAL HYPERTENSION: ICD-10-CM

## 2020-07-21 RX ORDER — LISINOPRIL 10 MG/1
10 TABLET ORAL DAILY
Qty: 30 TABLET | Refills: 1 | Status: SHIPPED | OUTPATIENT
Start: 2020-07-21 | End: 2020-07-24 | Stop reason: SDUPTHER

## 2020-07-24 ENCOUNTER — LAB (OUTPATIENT)
Dept: LAB | Facility: HOSPITAL | Age: 45
End: 2020-07-24

## 2020-07-24 ENCOUNTER — OFFICE VISIT (OUTPATIENT)
Dept: FAMILY MEDICINE CLINIC | Facility: CLINIC | Age: 45
End: 2020-07-24

## 2020-07-24 VITALS
TEMPERATURE: 97.4 F | DIASTOLIC BLOOD PRESSURE: 74 MMHG | OXYGEN SATURATION: 96 % | BODY MASS INDEX: 43.4 KG/M2 | HEIGHT: 69 IN | RESPIRATION RATE: 20 BRPM | SYSTOLIC BLOOD PRESSURE: 126 MMHG | HEART RATE: 71 BPM | WEIGHT: 293 LBS

## 2020-07-24 DIAGNOSIS — I10 ESSENTIAL HYPERTENSION: Primary | ICD-10-CM

## 2020-07-24 DIAGNOSIS — R73.03 PREDIABETES: ICD-10-CM

## 2020-07-24 DIAGNOSIS — Z11.59 ENCOUNTER FOR HEPATITIS C SCREENING TEST FOR LOW RISK PATIENT: ICD-10-CM

## 2020-07-24 LAB — HCV AB SER DONR QL: NORMAL

## 2020-07-24 PROCEDURE — 99213 OFFICE O/P EST LOW 20 MIN: CPT | Performed by: STUDENT IN AN ORGANIZED HEALTH CARE EDUCATION/TRAINING PROGRAM

## 2020-07-24 PROCEDURE — 36415 COLL VENOUS BLD VENIPUNCTURE: CPT | Performed by: STUDENT IN AN ORGANIZED HEALTH CARE EDUCATION/TRAINING PROGRAM

## 2020-07-24 PROCEDURE — 86803 HEPATITIS C AB TEST: CPT | Performed by: STUDENT IN AN ORGANIZED HEALTH CARE EDUCATION/TRAINING PROGRAM

## 2020-07-24 RX ORDER — LORAZEPAM 0.5 MG/1
0.5 TABLET ORAL EVERY 8 HOURS PRN
Qty: 15 TABLET | Refills: 0 | Status: CANCELLED | OUTPATIENT
Start: 2020-07-24

## 2020-07-24 RX ORDER — LISINOPRIL 10 MG/1
10 TABLET ORAL DAILY
Qty: 30 TABLET | Refills: 1 | Status: SHIPPED | OUTPATIENT
Start: 2020-07-24 | End: 2020-11-29 | Stop reason: SDUPTHER

## 2020-07-24 RX ORDER — METFORMIN HYDROCHLORIDE 500 MG/1
500 TABLET, EXTENDED RELEASE ORAL
Qty: 30 TABLET | Refills: 1 | Status: SHIPPED | OUTPATIENT
Start: 2020-07-24 | End: 2020-11-29 | Stop reason: SDUPTHER

## 2020-07-24 RX ORDER — ATENOLOL 50 MG/1
50 TABLET ORAL DAILY
Qty: 30 TABLET | Refills: 1 | Status: SHIPPED | OUTPATIENT
Start: 2020-07-24 | End: 2020-11-29 | Stop reason: SDUPTHER

## 2020-07-24 NOTE — PROGRESS NOTES
Family Medicine Residency  Master Mendez MD    Subjective:     Marlee Ayala is a 45 y.o. female who presents for follow up BP.    Difficulty obtaining BP cuff that fits arm compatible with divice. Will settle for wrist monitor if that's best we can work with. Asked to taekd aily measures. No recent HA, CP, SOA, or vision changes.    Has postponed her consult with bariatric surgery and [facility] due to concerns of rising COVID numbers.    Due for hep c screen, agreeable  No other complaints.     The following portions of the patient's history were reviewed and updated as appropriate: allergies, current medications, past family history, past medical history, past social history, past surgical history and problem list.    Past Medical Hx:  Past Medical History:   Diagnosis Date   • Abdominal pain     unspecified   • Cholelithiasis    • Family history of blood clots     Maternal    • Gallbladder problem     Gallbladder surgery removal     • Heartburn    • History of Papanicolaou smear of cervix 2005    postpartum satisfactory   • Hypertension    • Obesity    • Seasonal allergies        Past Surgical Hx:  Past Surgical History:   Procedure Laterality Date   • CHOLECYSTECTOMY     • DILATATION AND CURETTAGE  2000    incomplete        Current Meds:    Current Outpatient Medications:   •  atenolol (Tenormin) 50 MG tablet, Take 1 tablet by mouth Daily., Disp: 30 tablet, Rfl: 1  •  lisinopril (PRINIVIL,ZESTRIL) 10 MG tablet, Take 1 tablet by mouth Daily., Disp: 30 tablet, Rfl: 1  •  metFORMIN ER (GLUCOPHAGE-XR) 500 MG 24 hr tablet, Take 1 tablet by mouth Daily With Breakfast., Disp: 30 tablet, Rfl: 1    Allergies:  No Known Allergies    Family Hx:  Family History   Problem Relation Age of Onset   • Diabetes Father    • Stroke Father    • Hypertension Brother    • Prostate cancer Maternal Grandfather         Social History:  Social History     Socioeconomic History    "  • Marital status:      Spouse name: Not on file   • Number of children: Not on file   • Years of education: Not on file   • Highest education level: Not on file   Tobacco Use   • Smoking status: Never Smoker   • Smokeless tobacco: Never Used   Substance and Sexual Activity   • Alcohol use: No   • Drug use: No   • Sexual activity: Yes       Review of Systems  Review of Systems   Constitutional: Negative for chills and fatigue.   HENT: Negative for congestion and sore throat.    Eyes: Negative for pain and visual disturbance.   Respiratory: Negative for cough and shortness of breath.    Cardiovascular: Negative for chest pain and leg swelling.   Gastrointestinal: Negative for abdominal pain and nausea.   Genitourinary: Negative for dysuria and flank pain.   Musculoskeletal: Negative for back pain and gait problem.   Skin: Negative for pallor and rash.   Neurological: Negative for dizziness and headaches.   Psychiatric/Behavioral: Negative for confusion and dysphoric mood.       Objective:     /74 (BP Location: Left arm, Patient Position: Sitting, Cuff Size: Adult)   Pulse 71   Temp 97.4 °F (36.3 °C) (Tympanic)   Resp 20   Ht 175.3 cm (69\")   Wt (!) 138 kg (304 lb 1.6 oz)   LMP 07/02/2020   SpO2 96%   BMI 44.91 kg/m²      Physical Exam   Constitutional: She is oriented to person, place, and time. She appears well-developed and well-nourished.   HENT:   Head: Normocephalic and atraumatic.   Right Ear: External ear normal.   Left Ear: External ear normal.   Mouth/Throat: Oropharynx is clear and moist.   Eyes: Pupils are equal, round, and reactive to light. EOM are normal.   Neck: Normal range of motion. Neck supple.   Cardiovascular: Normal rate, regular rhythm, normal heart sounds and intact distal pulses.   Pulmonary/Chest: Effort normal and breath sounds normal.   Abdominal: Soft. Bowel sounds are normal.   Musculoskeletal: Normal range of motion. She exhibits no edema.   Neurological: She is " alert and oriented to person, place, and time.   Skin: Skin is warm. Capillary refill takes less than 2 seconds.   Psychiatric: She has a normal mood and affect. Her behavior is normal.        Assessment/Plan:     Marlee was seen today for hypertension.    Diagnoses and all orders for this visit:    Essential hypertension  Stable, controlled. Continue:  -     atenolol (Tenormin) 50 MG tablet; Take 1 tablet by mouth Daily.  -     lisinopril (PRINIVIL,ZESTRIL) 10 MG tablet; Take 1 tablet by mouth Daily.    Encounter for hepatitis C screening test for low risk patient  -     Hepatitis C Antibody    Prediabetes  Stable, diet controlled. Will start metformin low dose for control and to aid in weight loss.  -     metFORMIN ER (GLUCOPHAGE-XR) 500 MG 24 hr tablet; Take 1 tablet by mouth Daily With Breakfast.      · Rx changes: start metformin  · Patient Education: diet, exercise  · Compliance at present is estimated to be good.   · Efforts to improve compliance (if necessary) will be directed at increased exercise.     Follow-up:     Return in about 3 months (around 10/24/2020) for Recheck BP.    Preventative:  Health Maintenance   Topic Date Due   • ANNUAL PHYSICAL  02/03/1979   • HEPATITIS C SCREENING  02/06/2017   • PAP SMEAR  02/06/2017   • INFLUENZA VACCINE  08/01/2020   • TDAP/TD VACCINES (2 - Td) 10/22/2020     Female Preventative: Pap due - postponed due to covid  Recommended: none  Vaccine Counseling: N/A    Weight  -Class: Obese Class III extreme obesity: > or equal to 40kg/m2  -Patient's Body mass index is 44.91 kg/m². BMI is above normal parameters. Recommendations include: exercise counseling and nutrition counseling.   eat more fruits and vegetables, decrease soda or juice intake, increase water intake, increase physical activity, reduce portion size, cut out extra servings, reduce fast food intake, plan meals, eat breakfast and have 3 meals a day    Alcohol use:  reports that she does not drink  alcohol.  Nicotine status  reports that she has never smoked. She has never used smokeless tobacco.    Goals        Patient Stated    • Weight (lb) < 90.7 kg (200 lb) (pt-stated)      Barrier to goal: None.   -Encouraged a low calorie diet.   -Encouraged 30 minutes of moderate intensity activity at least 5 days a week.   -Plan to supplement with Saxenda.            RISK SCORE: 3      Master Mendez M.D. PGY2  [facility]  [address] [place] [address]  Office: [phone]    This document has been electronically signed by Master Mendez MD on July 24, 2020 10:01

## 2020-09-25 ENCOUNTER — OFFICE VISIT (OUTPATIENT)
Dept: FAMILY MEDICINE CLINIC | Facility: CLINIC | Age: 45
End: 2020-09-25

## 2020-09-25 ENCOUNTER — LAB (OUTPATIENT)
Dept: LAB | Facility: HOSPITAL | Age: 45
End: 2020-09-25

## 2020-09-25 VITALS — WEIGHT: 293 LBS | HEIGHT: 70 IN | BODY MASS INDEX: 41.95 KG/M2

## 2020-09-25 DIAGNOSIS — R35.0 INCREASED URINARY FREQUENCY: ICD-10-CM

## 2020-09-25 DIAGNOSIS — R35.0 INCREASED URINARY FREQUENCY: Primary | ICD-10-CM

## 2020-09-25 PROCEDURE — 81002 URINALYSIS NONAUTO W/O SCOPE: CPT | Performed by: STUDENT IN AN ORGANIZED HEALTH CARE EDUCATION/TRAINING PROGRAM

## 2020-09-25 PROCEDURE — 81003 URINALYSIS AUTO W/O SCOPE: CPT

## 2020-09-25 NOTE — PROGRESS NOTES
Family Medicine Residency  Melissa Tabares MD    Subjective:   You have chosen to receive care through a telephone visit. Do you consent to use a telephone visit for your medical care today? Yes    Marlee Ayala is a 45 y.o. female who presents for increased urinary frequency.    Urinary frequency  Patient states that ever since last night she has had increased urinary frequency, kept her up most of the night she had to constantly use the restroom.  She states the only pain she is having is lower pelvic pain.  She denies any fevers, chills, burning sensation when she urinates, or vaginal irritation.  Patient is just concerned that this is a urinary tract infection.    The following portions of the patient's history were reviewed and updated as appropriate: problem list.    Past Medical Hx:  Past Medical History:   Diagnosis Date   • Abdominal pain     unspecified   • Cholelithiasis    • Family history of blood clots     Maternal    • Gallbladder problem     Gallbladder surgery removal     • Heartburn    • History of Papanicolaou smear of cervix 2005    postpartum satisfactory   • Hypertension    • Obesity    • Seasonal allergies        Past Surgical Hx:  Past Surgical History:   Procedure Laterality Date   • CHOLECYSTECTOMY     • DILATATION AND CURETTAGE  2000    incomplete        Current Meds:    Current Outpatient Medications:   •  atenolol (Tenormin) 50 MG tablet, Take 1 tablet by mouth Daily., Disp: 30 tablet, Rfl: 1  •  lisinopril (PRINIVIL,ZESTRIL) 10 MG tablet, Take 1 tablet by mouth Daily., Disp: 30 tablet, Rfl: 1  •  metFORMIN ER (GLUCOPHAGE-XR) 500 MG 24 hr tablet, Take 1 tablet by mouth Daily With Breakfast., Disp: 30 tablet, Rfl: 1    Allergies:  No Known Allergies    Family Hx:  Family History   Problem Relation Age of Onset   • Diabetes Father    • Stroke Father    • Hypertension Brother    • Prostate cancer Maternal Grandfather         Social History:  Social History      "    Socioeconomic History   • Marital status:      Spouse name: Not on file   • Number of children: Not on file   • Years of education: Not on file   • Highest education level: Not on file   Tobacco Use   • Smoking status: Never Smoker   • Smokeless tobacco: Never Used   Substance and Sexual Activity   • Alcohol use: No   • Drug use: No   • Sexual activity: Yes       Review of Systems  Review of Systems   Constitutional: Negative for chills and fever.   HENT: Negative for rhinorrhea and sore throat.    Eyes: Negative for photophobia and visual disturbance.   Respiratory: Negative for cough and shortness of breath.    Cardiovascular: Negative for chest pain and palpitations.   Gastrointestinal: Negative for abdominal pain and nausea.   Genitourinary: Positive for pelvic pain and urgency. Negative for difficulty urinating, dyspareunia and dysuria.   Musculoskeletal: Negative for arthralgias and back pain.   Neurological: Negative for light-headedness and headaches.   Psychiatric/Behavioral: Negative for dysphoric mood and sleep disturbance.   All other systems reviewed and are negative.      Objective:     Ht 176.5 cm (69.5\")   Wt 136 kg (300 lb)   LMP 09/20/2020   BMI 43.67 kg/m²   Physical Exam  Constitutional:       Comments: Physical exam could not be performed due to this being a telephone/video visit encounter.  This telephone/video visit lasted 6 minutes.             Assessment/Plan:     Marlee was seen today for urinary tract infection.    Diagnoses and all orders for this visit:    Increased urinary frequency  -     Urinalysis With Culture If Indicated -; Future      1. Patient states that urinary frequency has decreased today, is still above normal, and pelvic pain is still present, but much improved.  Ordered a urinalysis with culture if indicated in the lab.  Patient states that she would swing by the lab and get it done this afternoon.  I told the patient that I would give her a call with these " results.  She stated she had a follow-up appointment with her PCP in 2 weeks.  I told her to keep that appointment, and I would let her know the results of the urinalysis and if any antibiotics was necessary.    2.   Patient understood and acknowledged education, counseling, medication/treatment benefits and side effects. All questions were answered and addressed.     · Rx changes: None.  · Patient Education: As above.  · Compliance at present is estimated to be good.   · Efforts to improve compliance (if necessary) will be directed at Establish physician-patient relationship..     Follow-up:     Return in about 4 weeks (around 10/23/2020) for Next scheduled follow up.    [Follow up plan: Urinalysis with culture results for increased urinary frequency.].    Preventative:  Health Maintenance   Topic Date Due   • ANNUAL PHYSICAL  02/03/1979   • PAP SMEAR  02/06/2017   • INFLUENZA VACCINE  08/01/2020   • TDAP/TD VACCINES (2 - Td) 10/22/2020   • HEPATITIS C SCREENING  Completed   • Pneumococcal Vaccine 0-64  Aged Out     Female Preventative: We will defer to PCP.  Recommended: Will defer to PCP.  Vaccine Counseling: N/A    Weight  -Class: Obese Class III extreme obesity: > or equal to 40kg/m2  -Patient's Body mass index is 43.67 kg/m². BMI is above normal parameters. Recommendations include: referral to primary care.   eat more fruits and vegetables, decrease soda or juice intake, increase water intake, increase physical activity and have 3 meals a day    Alcohol use:  reports no history of alcohol use.  Nicotine status  reports that she has never smoked. She has never used smokeless tobacco.    Goals        Patient Stated    • Weight (lb) < 90.7 kg (200 lb) (pt-stated)      Barrier to goal: None.   -Encouraged a low calorie diet.   -Encouraged 30 minutes of moderate intensity activity at least 5 days a week.   -Plan to supplement with Saxenda.          This was a telephone encounter that lasted 6 minutes.      RISK  SCORE: 2        This document has been electronically signed by Melissa Tabares MD on September 25, 2020 15:24 CDT     Part of this note may be an electronic transcription/translation of spoken language to printed text using the Dragon Dictation System.

## 2020-09-26 ENCOUNTER — TELEMEDICINE (OUTPATIENT)
Dept: FAMILY MEDICINE CLINIC | Facility: TELEHEALTH | Age: 45
End: 2020-09-26

## 2020-09-26 VITALS — WEIGHT: 293 LBS | BODY MASS INDEX: 41.95 KG/M2 | HEIGHT: 70 IN

## 2020-09-26 DIAGNOSIS — R30.0 DYSURIA: Primary | ICD-10-CM

## 2020-09-26 LAB
BILIRUB UR QL STRIP: NEGATIVE
CLARITY UR: ABNORMAL
COLOR UR: YELLOW
GLUCOSE UR STRIP-MCNC: NEGATIVE MG/DL
HGB UR QL STRIP.AUTO: NEGATIVE
KETONES UR QL STRIP: NEGATIVE
LEUKOCYTE ESTERASE UR QL STRIP.AUTO: NEGATIVE
NITRITE UR QL STRIP: NEGATIVE
PH UR STRIP.AUTO: 6 [PH] (ref 5–8)
PROT UR QL STRIP: NEGATIVE
SP GR UR STRIP: 1.02 (ref 1–1.03)
UROBILINOGEN UR QL STRIP: ABNORMAL

## 2020-09-26 PROCEDURE — 99213 OFFICE O/P EST LOW 20 MIN: CPT | Performed by: NURSE PRACTITIONER

## 2020-09-26 RX ORDER — PHENAZOPYRIDINE HYDROCHLORIDE 200 MG/1
200 TABLET, FILM COATED ORAL 3 TIMES DAILY PRN
Qty: 6 TABLET | Refills: 0 | Status: SHIPPED | OUTPATIENT
Start: 2020-09-26 | End: 2020-09-28

## 2020-09-26 NOTE — PROGRESS NOTES
"CHIEF COMPLAINT    Cc: urinary problems    HPI  Marlee Ayala is a 45 y.o. female  presents with complaint of urinary frequency, urgency, and pain at the end of urination. She has been drinking a lot of cranberry juice because she hear that this could help her.She does not have history of UTI's. She had a urinalysis done yesterday by PCP office but the patient was not contacted with the results and is till symtpomstic    Review of Systems   Constitutional: Negative for chills and fever.   HENT: Negative for congestion and sore throat.    Respiratory: Negative for cough, shortness of breath and wheezing.    Cardiovascular: Negative for chest pain.   Gastrointestinal: Negative for diarrhea, nausea and vomiting.   Genitourinary: Positive for dysuria and frequency. Negative for flank pain, genital sores, hematuria, urgency and vaginal discharge.       Past Medical History:   Diagnosis Date   • Abdominal pain     unspecified   • Cholelithiasis    • Family history of blood clots     Maternal    • Gallbladder problem     Gallbladder surgery removal 2001    • Heartburn    • History of Papanicolaou smear of cervix 06/22/2005    postpartum satisfactory   • Hypertension    • Obesity    • Seasonal allergies        Family History   Problem Relation Age of Onset   • Diabetes Father    • Stroke Father    • Hypertension Brother    • Prostate cancer Maternal Grandfather        Social History     Socioeconomic History   • Marital status:      Spouse name: Not on file   • Number of children: Not on file   • Years of education: Not on file   • Highest education level: Not on file   Tobacco Use   • Smoking status: Never Smoker   • Smokeless tobacco: Never Used   Substance and Sexual Activity   • Alcohol use: No   • Drug use: No   • Sexual activity: Yes         Ht 176.5 cm (69.5\")   Wt 136 kg (300 lb)   LMP 09/20/2020   Breastfeeding No   BMI 43.67 kg/m²     PHYSICAL EXAM  Physical Exam   Constitutional: She appears " well-developed and well-nourished.   HENT:   Head: Normocephalic and atraumatic.   Nose: Nose normal.   Eyes: Right eye exhibits no discharge and no exudate. Left eye exhibits no discharge and no exudate. Right conjunctiva is not injected. Left conjunctiva is not injected.   Pulmonary/Chest: No accessory muscle usage. No tachypnea and no bradypnea.  No respiratory distress.No use of oxygen by nasal cannulaNo use of oxygen by mask noted.  Abdominal: There is no CVA tenderness.   Patient directed exam, no suprapubic discomfort   Neurological: She is alert. She appears lethargic. No cranial nerve deficit.   Skin: Her skin appears normal.  Psychiatric: She has a normal mood and affect. Her speech is normal and behavior is normal. Judgment and thought content normal.       Results for orders placed or performed in visit on 09/25/20   Urinalysis With Culture If Indicated - Urine, Clean Catch    Specimen: Urine, Clean Catch   Result Value Ref Range    Color, UA Yellow Yellow, Straw    Appearance, UA Turbid (A) Clear    pH, UA 6.0 5.0 - 8.0    Specific Gravity, UA 1.025 1.005 - 1.030    Glucose, UA Negative Negative    Ketones, UA Negative Negative    Bilirubin, UA Negative Negative    Blood, UA Negative Negative    Protein, UA Negative Negative    Leuk Esterase, UA Negative Negative    Nitrite, UA Negative Negative    Urobilinogen, UA 0.2 E.U./dL 0.2 - 1.0 E.U./dL       Diagnoses and all orders for this visit:    Dysuria    Other orders  -     phenazopyridine (Pyridium) 200 MG tablet; Take 1 tablet by mouth 3 (Three) Times a Day As Needed for Bladder Spasms for up to 2 days.    Urinalysis was negative, no culture was indicated,turbid urine noted   Increase water intake  If symptoms worsen or persist follow up with PCP or Urgent Care so an in person physical assessment can be performed    if at any raad you experiences fever, worsening cough AND/OR shortness of breath you have been advised to go to nearest urgent care or  emergency department for evaluation AND/OR testing    FOLLOW-UP  If symptoms worsen or persist follow up with PCP or Urgent Care so an in person physical assessment can be performed    Patient verbalizes understanding of medication dosage, comfort measures, instructions for treatment and follow-up.    TRISH Oneil  09/26/2020  11:12 EDT    This visit was performed via Telehealth.  This patient has been instructed to follow-up with their primary care provider if their symptoms worsen or the treatment provided does not resolve their illness.

## 2020-09-28 ENCOUNTER — TELEPHONE (OUTPATIENT)
Dept: FAMILY MEDICINE CLINIC | Facility: CLINIC | Age: 45
End: 2020-09-28

## 2020-09-28 NOTE — PROGRESS NOTES
I have reviewed the notes, assessments, and/or procedures performed by Dr. Tabares, I concur with her/his documentation and assessment and plan for Marlee Ayala.                This document has been electronically signed by Jeferson Thapa MD on September 28, 2020 10:07 CDT

## 2020-09-28 NOTE — TELEPHONE ENCOUNTER
[id number] Call made to patient in regards to issue getting results back from her visit with Dr. Tabares Friday, Sept 25th. Informed patient we could drop the charge for the telephone visit but will still charge for her UA. Patient was understanding and stated she did not want this to reflect negative on Dr. Tabares as she was very sweet. Instructed patient to call if any questions or issues. Patient voiced understanding.

## 2020-11-29 DIAGNOSIS — R73.03 PREDIABETES: ICD-10-CM

## 2020-11-29 DIAGNOSIS — I10 ESSENTIAL HYPERTENSION: ICD-10-CM

## 2020-11-30 RX ORDER — LISINOPRIL 10 MG/1
10 TABLET ORAL DAILY
Qty: 30 TABLET | Refills: 1 | Status: SHIPPED | OUTPATIENT
Start: 2020-11-30 | End: 2020-12-02 | Stop reason: SDUPTHER

## 2020-11-30 RX ORDER — METFORMIN HYDROCHLORIDE 500 MG/1
500 TABLET, EXTENDED RELEASE ORAL
Qty: 30 TABLET | Refills: 1 | Status: SHIPPED | OUTPATIENT
Start: 2020-11-30 | End: 2020-12-02 | Stop reason: SDUPTHER

## 2020-11-30 RX ORDER — ATENOLOL 50 MG/1
50 TABLET ORAL DAILY
Qty: 30 TABLET | Refills: 1 | Status: SHIPPED | OUTPATIENT
Start: 2020-11-30 | End: 2021-03-03 | Stop reason: SDUPTHER

## 2020-12-02 ENCOUNTER — TELEMEDICINE (OUTPATIENT)
Dept: FAMILY MEDICINE CLINIC | Facility: CLINIC | Age: 45
End: 2020-12-02

## 2020-12-02 VITALS — WEIGHT: 293 LBS | BODY MASS INDEX: 43.4 KG/M2 | HEIGHT: 69 IN

## 2020-12-02 DIAGNOSIS — J30.2 SEASONAL ALLERGIES: Primary | ICD-10-CM

## 2020-12-02 DIAGNOSIS — I10 ESSENTIAL HYPERTENSION: ICD-10-CM

## 2020-12-02 DIAGNOSIS — R73.03 PREDIABETES: ICD-10-CM

## 2020-12-02 PROCEDURE — 99213 OFFICE O/P EST LOW 20 MIN: CPT | Performed by: STUDENT IN AN ORGANIZED HEALTH CARE EDUCATION/TRAINING PROGRAM

## 2020-12-02 RX ORDER — CETIRIZINE HYDROCHLORIDE 5 MG/1
5 TABLET ORAL DAILY
Qty: 30 TABLET | Refills: 6 | Status: SHIPPED | OUTPATIENT
Start: 2020-12-02 | End: 2021-04-27

## 2020-12-02 RX ORDER — METFORMIN HYDROCHLORIDE 500 MG/1
500 TABLET, EXTENDED RELEASE ORAL
Qty: 30 TABLET | Refills: 1 | Status: SHIPPED | OUTPATIENT
Start: 2020-12-02 | End: 2021-04-27 | Stop reason: SDUPTHER

## 2020-12-02 RX ORDER — CETIRIZINE HYDROCHLORIDE 5 MG/1
5 TABLET ORAL DAILY
Qty: 30 TABLET | Refills: 6 | Status: SHIPPED | OUTPATIENT
Start: 2020-12-02 | End: 2020-12-02

## 2020-12-02 RX ORDER — LISINOPRIL 10 MG/1
10 TABLET ORAL DAILY
Qty: 30 TABLET | Refills: 1 | Status: SHIPPED | OUTPATIENT
Start: 2020-12-02 | End: 2021-03-03 | Stop reason: SDUPTHER

## 2020-12-02 NOTE — PROGRESS NOTES
Family Medicine Residency  Darren Greenberg MD    Subjective:     You have chosen to receive care through a telephone video visit. Do you consent to use a telephone visit for your medical care today? Yes    Ms. Marlee Ayala is a 45 y.o. female, followed by Dr. Master Mendez, who presents via Doximity interview for management of hypertension, prediabetes, and morbid obesity BMI >40.     She has a BMI of 44.30 and is currently being treated for prediabetes with Metformin extended release 500 mg 24-hour tablet.  She had a referral to bariatric surgery but has postponed it due to the COVID-19 pandemic.    At her last office visit on 7/24/2020, her blood pressure was well controlled at 126/74.  She tells me today that she continues to monitor her blood pressures at home and they continue to remain within the normal range.    She has no trouble with the Metformin and does not complain of diarrhea or other side effects.  Neither does she say that she feels it has helped her lose weight.  It has been almost 6 months since her last hemoglobin A1c.  I will order a repeat hemoglobin A1c for her for the future and schedule her to follow-up with her PCP, Dr. Master Mendez, in 1 month.    She does say that she has cut out all sodas continues to drink sweet tea.  I encouraged her to cut out sweet tea and drink unsweetened tea and she said that she will start drinking half-and-half by way of compromise.  I asked her about her exercise habits and she says that she cannot get to the gym because of Covid and does not exercise outside due to the cold weather but that she has a treadmill that she does not use often.  I encouraged her to exercise moderate to high intensity for up to 30 minutes at least 5 days a week.  I also encouraged her to eat a healthier diet.  She had a referral to bariatric surgery but has postponed it due to the COVID-19 pandemic.  She is currently considering making a new appointment with them this  "month.    She also describes a history of seasonal \"bronchitis\"associated with cough and rhinitis in the spring.  I will start her on an antihistamine Zyrtec today but could consider Flonase or even Singulair in the future.      The following portions of the patient's history were reviewed and updated as appropriate: allergies, current medications, past family history, past medical history, past social history, past surgical history and problem list.    Past Medical Hx:  Past Medical History:   Diagnosis Date   • Abdominal pain     unspecified   • Cholelithiasis    • Family history of blood clots     Maternal    • Gallbladder problem     Gallbladder surgery removal     • Heartburn    • History of Papanicolaou smear of cervix 2005    postpartum satisfactory   • Hypertension    • Obesity    • Seasonal allergies        Past Surgical Hx:  Past Surgical History:   Procedure Laterality Date   • CHOLECYSTECTOMY     • DILATATION AND CURETTAGE  2000    incomplete        Current Meds:    Current Outpatient Medications:   •  atenolol (Tenormin) 50 MG tablet, Take 1 tablet by mouth Daily., Disp: 30 tablet, Rfl: 1  •  lisinopril (PRINIVIL,ZESTRIL) 10 MG tablet, Take 1 tablet by mouth Daily., Disp: 30 tablet, Rfl: 1  •  metFORMIN ER (GLUCOPHAGE-XR) 500 MG 24 hr tablet, Take 1 tablet by mouth Daily With Breakfast., Disp: 30 tablet, Rfl: 1  •  cetirizine (zyrTEC) 5 MG tablet, Take 1 tablet by mouth Daily., Disp: 30 tablet, Rfl: 6    Allergies:  No Known Allergies    Family Hx:  Family History   Problem Relation Age of Onset   • Diabetes Father    • Stroke Father    • Hypertension Brother    • Prostate cancer Maternal Grandfather         Social History:  Social History     Socioeconomic History   • Marital status:      Spouse name: Not on file   • Number of children: Not on file   • Years of education: Not on file   • Highest education level: Not on file   Tobacco Use   • Smoking status: Never Smoker   " "  • Smokeless tobacco: Never Used   Substance and Sexual Activity   • Alcohol use: No   • Drug use: No   • Sexual activity: Yes       Review of Systems  Review of Systems   Respiratory: Positive for chest tightness (occassional ). Negative for shortness of breath and wheezing.    Cardiovascular: Negative for chest pain and leg swelling.   Genitourinary: Negative for difficulty urinating and dysuria.   Neurological: Negative for dizziness, light-headedness and headaches.       Objective:     Ht 175.3 cm (69\")   Wt 136 kg (300 lb)   BMI 44.30 kg/m²   Physical Exam Telephone interview - unable to conduct physical exam      Assessment/Plan:   Ms. Marlee Ayala is a 45 y.o. female, followed by Dr. Master Mendez, who presents via Doximity interview for management of hypertension, prediabetes, and morbid obesity BMI >40.     Diagnoses and all orders for this visit:    1. Seasonal allergies (Primary)  Ms. Ayala describes a history of seasonal \"bronchitis\" associated with cough and rhinitis in the spring.  He has never been on an antihistamine.  I will start her on an antihistamine Zyrtec today but could consider Flonase or even Singulair in the future depending on her symptoms.     -     cetirizine (zyrTEC) 5 MG tablet; Take 1 tablet by mouth Daily.  Dispense: 30 tablet; Refill: 6    2. Essential hypertension  At her last office visit on 7/24/2020, her blood pressure was well controlled at 126/74.  She tells me today that she continues to monitor her blood pressures at home and they continue to remain within the normal range.  I will refill her medicine today.    -     lisinopril (PRINIVIL,ZESTRIL) 10 MG tablet; Take 1 tablet by mouth Daily.  Dispense: 30 tablet; Refill: 1    3. Prediabetes   Ms. Ayala is currently being treated for prediabetes with Metformin extended release 500 mg 24-hour tablet.  She had a referral to bariatric surgery but has postponed it due to the COVID-19 pandemic. She has no trouble with " the Metformin and does not complain of diarrhea or other side effects.  Neither does she say that she feels it has helped her lose weight.  It has been almost 6 months since her last hemoglobin A1c.  I will order a repeat hemoglobin A1c for her for the future and schedule her to follow-up with her PCP, Dr. Master Mendez, in 1 month.  I will refill her Metformin today.    -     metFORMIN ER (GLUCOPHAGE-XR) 500 MG 24 hr tablet; Take 1 tablet by mouth Daily With Breakfast.  Dispense: 30 tablet; Refill: 1  -     Hemoglobin A1c; Future    4. Morbid Obesity  Ms. Ayala has a BMI of 44.30 andShe does say that she has cut out all sodas continues to drink sweet tea.  I encouraged her to cut out sweet tea and drink unsweetened tea and she said that she will start drinking half-and-half by way of compromise.  I asked her about her exercise habits and she says that she cannot get to the gym because of Covid and does not exercise outside due to the cold weather but that she has a treadmill that she does not use often.  I encouraged her to exercise moderate to high intensity for up to 30 minutes at least 5 days a week.  I also encouraged her to eat a healthier diet.  She had a referral to bariatric surgery but has postponed it due to the COVID-19 pandemic.  She is currently considering making a new appointment with them this month.        · Rx changes: added Zyrtec 5 mg  · Patient Education: Encouraged to exercise 30 minutes a day 5 days a week and eat a healthier diet  · Compliance at present is estimated to be fair.   · Efforts to improve compliance (if necessary) will be directed at dietary modifications and increased exercise.     Follow-up:     Follow up with PCP in 1 month for management of Morbid Obesity and Prediabetes.     Preventative:  Health Maintenance   Topic Date Due   • ANNUAL PHYSICAL  02/03/1979   • PAP SMEAR  02/06/2017   • INFLUENZA VACCINE  08/01/2020   • TDAP/TD VACCINES (2 - Td) 10/22/2020   • HEPATITIS  C SCREENING  Completed   • Pneumococcal Vaccine 0-64  Aged Out       Weight  -Class: Obese Class III extreme obesity: > or equal to 40kg/m2  -Patient's Body mass index is 44.3 kg/m². BMI is above normal parameters. Recommendations include: exercise counseling.   increase physical activity    Alcohol use:  reports no history of alcohol use.  Nicotine status  reports that she has never smoked. She has never used smokeless tobacco.    Goals     • Weight (lb) < 90.7 kg (200 lb) (pt-stated)      Barrier to goal: None.   -Encouraged a low calorie diet.   -Encouraged 30 minutes of moderate intensity activity at least 5 days a week.               RISK SCORE: 3      This document has been electronically signed by Darren Greenberg MD on December 2, 2020 15:13 CST      This was a Doximitty video encounter. Approximately 15 minutes was spent on this encounter.

## 2020-12-09 NOTE — PROGRESS NOTES
Subjective:     Marlee Ayala is a 45 y.o. female who presents for   Chief Complaint   Patient presents with   • Medication Problem     Pt presents for f/u of HTN, prediabetes, obesity. Reports BP well controlled at home. Managing prediabetes with lifestyle modification and had bariatric surgery planned prior to covid pandemic, now on hold.    For more detailed HPI, see resident note.        Past Medical Hx:  Past Medical History:   Diagnosis Date   • Abdominal pain     unspecified   • Cholelithiasis    • Family history of blood clots     Maternal    • Gallbladder problem     Gallbladder surgery removal     • Heartburn    • History of Papanicolaou smear of cervix 2005    postpartum satisfactory   • Hypertension    • Obesity    • Seasonal allergies        Past Surgical Hx:  Past Surgical History:   Procedure Laterality Date   • CHOLECYSTECTOMY     • DILATATION AND CURETTAGE  2000    incomplete        Health Maintenance:  Health Maintenance   Topic Date Due   • ANNUAL PHYSICAL  1979   • PAP SMEAR  2017   • INFLUENZA VACCINE  2020   • TDAP/TD VACCINES (2 - Td) 10/22/2020   • HEPATITIS C SCREENING  Completed   • Pneumococcal Vaccine 0-64  Aged Out       Current Meds:    Current Outpatient Medications:   •  atenolol (Tenormin) 50 MG tablet, Take 1 tablet by mouth Daily., Disp: 30 tablet, Rfl: 1  •  lisinopril (PRINIVIL,ZESTRIL) 10 MG tablet, Take 1 tablet by mouth Daily., Disp: 30 tablet, Rfl: 1  •  metFORMIN ER (GLUCOPHAGE-XR) 500 MG 24 hr tablet, Take 1 tablet by mouth Daily With Breakfast., Disp: 30 tablet, Rfl: 1  •  cetirizine (zyrTEC) 5 MG tablet, Take 1 tablet by mouth Daily., Disp: 30 tablet, Rfl: 6    Allergies:  Patient has no known allergies.    Family Hx:  Family History   Problem Relation Age of Onset   • Diabetes Father    • Stroke Father    • Hypertension Brother    • Prostate cancer Maternal Grandfather         Social History:  Social History      "    Socioeconomic History   • Marital status:      Spouse name: Not on file   • Number of children: Not on file   • Years of education: Not on file   • Highest education level: Not on file   Tobacco Use   • Smoking status: Never Smoker   • Smokeless tobacco: Never Used   Substance and Sexual Activity   • Alcohol use: No   • Drug use: No   • Sexual activity: Yes       Review of Systems  Review of Systems   Constitutional: Negative.    Respiratory: Negative.    Cardiovascular: Negative.    Neurological: Negative.    Psychiatric/Behavioral: Negative.        Objective:     Ht 175.3 cm (69\")   Wt 136 kg (300 lb)   BMI 44.30 kg/m²   Physical Exam  Telephone visit  Lab Review  Results for orders placed or performed in visit on 09/25/20   Urinalysis With Culture If Indicated - Urine, Clean Catch    Specimen: Urine, Clean Catch   Result Value Ref Range    Color, UA Yellow Yellow, Straw    Appearance, UA Turbid (A) Clear    pH, UA 6.0 5.0 - 8.0    Specific Gravity, UA 1.025 1.005 - 1.030    Glucose, UA Negative Negative    Ketones, UA Negative Negative    Bilirubin, UA Negative Negative    Blood, UA Negative Negative    Protein, UA Negative Negative    Leuk Esterase, UA Negative Negative    Nitrite, UA Negative Negative    Urobilinogen, UA 0.2 E.U./dL 0.2 - 1.0 E.U./dL            Assessment:     Diagnoses and all orders for this visit:    1. Seasonal allergies (Primary)  -     cetirizine (zyrTEC) 5 MG tablet; Take 1 tablet by mouth Daily.  Dispense: 30 tablet; Refill: 6    2. Essential hypertension  -     lisinopril (PRINIVIL,ZESTRIL) 10 MG tablet; Take 1 tablet by mouth Daily.  Dispense: 30 tablet; Refill: 1    3. Prediabetes  -     metFORMIN ER (GLUCOPHAGE-XR) 500 MG 24 hr tablet; Take 1 tablet by mouth Daily With Breakfast.  Dispense: 30 tablet; Refill: 1  -     Hemoglobin A1c; Future    Other orders  -     Discontinue: cetirizine (zyrTEC) 5 MG tablet; Take 1 tablet by mouth Daily.  Dispense: 30 tablet; Refill: " 6        Plan:     I have seen and examined the patient.  I have reviewed the notes, assessments, and/or procedures performed by Darren Greenberg MD, I concur with her/his documentation and assessment and plan for Marlee Ayala.            This document has been electronically signed by Lisandro Solano MD on December 9, 2020 15:32 CST

## 2021-02-09 NOTE — PROGRESS NOTES
I have reviewed the notes, assessments, and/or procedures performed by Kalyani Lim MD , I concur with her/his documentation of Marlee Ayala.       This document has been electronically signed by Loren Storey MD on May 3, 2019 8:03 AM       0

## 2021-03-03 ENCOUNTER — TELEPHONE (OUTPATIENT)
Dept: FAMILY MEDICINE CLINIC | Facility: CLINIC | Age: 46
End: 2021-03-03

## 2021-03-03 DIAGNOSIS — I10 ESSENTIAL HYPERTENSION: ICD-10-CM

## 2021-03-03 RX ORDER — LISINOPRIL 10 MG/1
10 TABLET ORAL DAILY
Qty: 30 TABLET | Refills: 1 | Status: SHIPPED | OUTPATIENT
Start: 2021-03-03 | End: 2021-04-27 | Stop reason: SDUPTHER

## 2021-03-03 RX ORDER — ATENOLOL 50 MG/1
50 TABLET ORAL DAILY
Qty: 30 TABLET | Refills: 1 | Status: SHIPPED | OUTPATIENT
Start: 2021-03-03 | End: 2021-04-27 | Stop reason: SDUPTHER

## 2021-04-21 NOTE — TELEPHONE ENCOUNTER
Marlee called wanting to establish with Dr Arnold, said she is tired of having to change doctors in residents area.  She is needing refills sent to [facility] for Atenolol and Lisinopril to last until she can be seen at the end of April.    
Refills sent to the pharmacy.  Please let patient know that this is unable to be extended, so she must keep her new patient appointment.  ThanksHUMAIRA  
Spoke to patient  
negative

## 2021-04-27 ENCOUNTER — LAB (OUTPATIENT)
Dept: LAB | Facility: HOSPITAL | Age: 46
End: 2021-04-27

## 2021-04-27 ENCOUNTER — OFFICE VISIT (OUTPATIENT)
Dept: FAMILY MEDICINE CLINIC | Facility: CLINIC | Age: 46
End: 2021-04-27

## 2021-04-27 VITALS
WEIGHT: 293 LBS | BODY MASS INDEX: 43.4 KG/M2 | HEIGHT: 69 IN | OXYGEN SATURATION: 99 % | DIASTOLIC BLOOD PRESSURE: 78 MMHG | HEART RATE: 78 BPM | SYSTOLIC BLOOD PRESSURE: 126 MMHG

## 2021-04-27 DIAGNOSIS — R73.03 PREDIABETES: ICD-10-CM

## 2021-04-27 DIAGNOSIS — M79.89 SWELLING OF LEFT LOWER EXTREMITY: ICD-10-CM

## 2021-04-27 DIAGNOSIS — Z76.89 ENCOUNTER TO ESTABLISH CARE: ICD-10-CM

## 2021-04-27 DIAGNOSIS — I10 ESSENTIAL HYPERTENSION: Primary | ICD-10-CM

## 2021-04-27 DIAGNOSIS — J30.2 SEASONAL ALLERGIES: ICD-10-CM

## 2021-04-27 DIAGNOSIS — E66.01 CLASS 3 SEVERE OBESITY DUE TO EXCESS CALORIES WITH SERIOUS COMORBIDITY AND BODY MASS INDEX (BMI) OF 45.0 TO 49.9 IN ADULT (HCC): ICD-10-CM

## 2021-04-27 DIAGNOSIS — I10 ESSENTIAL HYPERTENSION: ICD-10-CM

## 2021-04-27 PROCEDURE — 99214 OFFICE O/P EST MOD 30 MIN: CPT | Performed by: FAMILY MEDICINE

## 2021-04-27 RX ORDER — CETIRIZINE HYDROCHLORIDE 10 MG/1
10 TABLET ORAL DAILY
Qty: 30 TABLET | Refills: 5 | Status: SHIPPED | OUTPATIENT
Start: 2021-04-27 | End: 2021-07-27 | Stop reason: SDUPTHER

## 2021-04-27 RX ORDER — METFORMIN HYDROCHLORIDE 500 MG/1
500 TABLET, EXTENDED RELEASE ORAL
Qty: 30 TABLET | Refills: 1 | Status: SHIPPED | OUTPATIENT
Start: 2021-04-27 | End: 2021-06-25 | Stop reason: SDUPTHER

## 2021-04-27 RX ORDER — ATENOLOL 50 MG/1
50 TABLET ORAL DAILY
Qty: 30 TABLET | Refills: 1 | Status: SHIPPED | OUTPATIENT
Start: 2021-04-27 | End: 2021-06-25 | Stop reason: SDUPTHER

## 2021-04-27 RX ORDER — LISINOPRIL 10 MG/1
10 TABLET ORAL DAILY
Qty: 30 TABLET | Refills: 1 | Status: SHIPPED | OUTPATIENT
Start: 2021-04-27 | End: 2021-06-25 | Stop reason: SDUPTHER

## 2021-04-28 LAB
ALBUMIN SERPL-MCNC: 3.8 G/DL (ref 3.5–5.2)
ALBUMIN/GLOB SERPL: 1.2 G/DL
ALP SERPL-CCNC: 57 U/L (ref 39–117)
ALT SERPL W P-5'-P-CCNC: 35 U/L (ref 1–33)
ANION GAP SERPL CALCULATED.3IONS-SCNC: 8 MMOL/L (ref 5–15)
AST SERPL-CCNC: 25 U/L (ref 1–32)
BASOPHILS # BLD AUTO: 0.02 10*3/MM3 (ref 0–0.2)
BASOPHILS NFR BLD AUTO: 0.3 % (ref 0–1.5)
BILIRUB SERPL-MCNC: 0.4 MG/DL (ref 0–1.2)
BUN SERPL-MCNC: 11 MG/DL (ref 6–20)
BUN/CREAT SERPL: 15.9 (ref 7–25)
CALCIUM SPEC-SCNC: 8.9 MG/DL (ref 8.6–10.5)
CHLORIDE SERPL-SCNC: 105 MMOL/L (ref 98–107)
CHOLEST SERPL-MCNC: 183 MG/DL (ref 0–200)
CO2 SERPL-SCNC: 24 MMOL/L (ref 22–29)
CREAT SERPL-MCNC: 0.69 MG/DL (ref 0.57–1)
DEPRECATED RDW RBC AUTO: 48.2 FL (ref 37–54)
EOSINOPHIL # BLD AUTO: 0.21 10*3/MM3 (ref 0–0.4)
EOSINOPHIL NFR BLD AUTO: 3 % (ref 0.3–6.2)
ERYTHROCYTE [DISTWIDTH] IN BLOOD BY AUTOMATED COUNT: 14.1 % (ref 12.3–15.4)
GFR SERPL CREATININE-BSD FRML MDRD: 92 ML/MIN/1.73
GLOBULIN UR ELPH-MCNC: 3.1 GM/DL
GLUCOSE SERPL-MCNC: 113 MG/DL (ref 65–99)
HBA1C MFR BLD: 6 % (ref 4.8–5.6)
HCT VFR BLD AUTO: 36.6 % (ref 34–46.6)
HDLC SERPL-MCNC: 40 MG/DL (ref 40–60)
HGB BLD-MCNC: 11.7 G/DL (ref 12–15.9)
IMM GRANULOCYTES # BLD AUTO: 0.02 10*3/MM3 (ref 0–0.05)
IMM GRANULOCYTES NFR BLD AUTO: 0.3 % (ref 0–0.5)
LDLC SERPL CALC-MCNC: 118 MG/DL (ref 0–100)
LDLC/HDLC SERPL: 2.89 {RATIO}
LYMPHOCYTES # BLD AUTO: 1.33 10*3/MM3 (ref 0.7–3.1)
LYMPHOCYTES NFR BLD AUTO: 19.2 % (ref 19.6–45.3)
MCH RBC QN AUTO: 29.5 PG (ref 26.6–33)
MCHC RBC AUTO-ENTMCNC: 32 G/DL (ref 31.5–35.7)
MCV RBC AUTO: 92.2 FL (ref 79–97)
MONOCYTES # BLD AUTO: 0.55 10*3/MM3 (ref 0.1–0.9)
MONOCYTES NFR BLD AUTO: 7.9 % (ref 5–12)
NEUTROPHILS NFR BLD AUTO: 4.8 10*3/MM3 (ref 1.7–7)
NEUTROPHILS NFR BLD AUTO: 69.3 % (ref 42.7–76)
NRBC BLD AUTO-RTO: 0 /100 WBC (ref 0–0.2)
PLATELET # BLD AUTO: 273 10*3/MM3 (ref 140–450)
PMV BLD AUTO: 11.3 FL (ref 6–12)
POTASSIUM SERPL-SCNC: 4 MMOL/L (ref 3.5–5.2)
PROT SERPL-MCNC: 6.9 G/DL (ref 6–8.5)
RBC # BLD AUTO: 3.97 10*6/MM3 (ref 3.77–5.28)
SODIUM SERPL-SCNC: 137 MMOL/L (ref 136–145)
TRIGL SERPL-MCNC: 138 MG/DL (ref 0–150)
VLDLC SERPL-MCNC: 25 MG/DL (ref 5–40)
WBC # BLD AUTO: 6.93 10*3/MM3 (ref 3.4–10.8)

## 2021-04-28 PROCEDURE — 85025 COMPLETE CBC W/AUTO DIFF WBC: CPT

## 2021-04-28 PROCEDURE — 80061 LIPID PANEL: CPT

## 2021-04-28 PROCEDURE — 36415 COLL VENOUS BLD VENIPUNCTURE: CPT

## 2021-04-28 PROCEDURE — 80053 COMPREHEN METABOLIC PANEL: CPT

## 2021-04-28 PROCEDURE — 83036 HEMOGLOBIN GLYCOSYLATED A1C: CPT

## 2021-05-03 ENCOUNTER — TELEPHONE (OUTPATIENT)
Dept: FAMILY MEDICINE CLINIC | Facility: CLINIC | Age: 46
End: 2021-05-03

## 2021-05-04 ENCOUNTER — TELEPHONE (OUTPATIENT)
Dept: FAMILY MEDICINE CLINIC | Facility: CLINIC | Age: 46
End: 2021-05-04

## 2021-05-04 NOTE — TELEPHONE ENCOUNTER
Per Dr. Arnold, Ms. Ayala has been called with recent lab results and recommendations.   Continue current medications and follow-up as planned or sooner if any problems.

## 2021-05-04 NOTE — TELEPHONE ENCOUNTER
Please call and let patient know the following results:    - CMP shows elevated glucose at 113. HgbA1c is 6.00%, which is consistent with prediabetes  - CBC shows mild anemia.  Will continue to monitor.  - Lipid panel shows mildly elevated LDL cholesterol.  Would recommend healthy eating and increased physical activity.  Will monitor annually.  Have sent additional information about this and prediabetes to her mychart,      Thanks, HUMAIRA Arnold

## 2021-05-04 NOTE — TELEPHONE ENCOUNTER
Pt was suppose to have a [facility] but they told her that her part would be almost $800.00. So she cancelled it. Call her at [phone].

## 2021-06-16 ENCOUNTER — TELEPHONE (OUTPATIENT)
Dept: FAMILY MEDICINE CLINIC | Facility: CLINIC | Age: 46
End: 2021-06-16

## 2021-06-25 DIAGNOSIS — I10 ESSENTIAL HYPERTENSION: ICD-10-CM

## 2021-06-25 DIAGNOSIS — R73.03 PREDIABETES: ICD-10-CM

## 2021-06-25 RX ORDER — METFORMIN HYDROCHLORIDE 500 MG/1
500 TABLET, EXTENDED RELEASE ORAL
Qty: 30 TABLET | Refills: 1 | Status: SHIPPED | OUTPATIENT
Start: 2021-06-25 | End: 2021-07-27 | Stop reason: SDUPTHER

## 2021-06-25 RX ORDER — LISINOPRIL 10 MG/1
10 TABLET ORAL DAILY
Qty: 30 TABLET | Refills: 1 | Status: SHIPPED | OUTPATIENT
Start: 2021-06-25 | End: 2021-07-27 | Stop reason: SDUPTHER

## 2021-06-25 RX ORDER — ATENOLOL 50 MG/1
50 TABLET ORAL DAILY
Qty: 30 TABLET | Refills: 1 | Status: SHIPPED | OUTPATIENT
Start: 2021-06-25 | End: 2021-07-27 | Stop reason: SDUPTHER

## 2021-07-27 ENCOUNTER — OFFICE VISIT (OUTPATIENT)
Dept: FAMILY MEDICINE CLINIC | Facility: CLINIC | Age: 46
End: 2021-07-27

## 2021-07-27 VITALS
WEIGHT: 293 LBS | DIASTOLIC BLOOD PRESSURE: 88 MMHG | SYSTOLIC BLOOD PRESSURE: 130 MMHG | HEART RATE: 76 BPM | OXYGEN SATURATION: 98 % | BODY MASS INDEX: 43.4 KG/M2 | HEIGHT: 69 IN

## 2021-07-27 DIAGNOSIS — E66.01 CLASS 3 SEVERE OBESITY DUE TO EXCESS CALORIES WITH SERIOUS COMORBIDITY AND BODY MASS INDEX (BMI) OF 45.0 TO 49.9 IN ADULT (HCC): Primary | ICD-10-CM

## 2021-07-27 DIAGNOSIS — R73.03 PREDIABETES: ICD-10-CM

## 2021-07-27 DIAGNOSIS — J30.2 SEASONAL ALLERGIES: ICD-10-CM

## 2021-07-27 DIAGNOSIS — I10 ESSENTIAL HYPERTENSION: ICD-10-CM

## 2021-07-27 PROCEDURE — 99214 OFFICE O/P EST MOD 30 MIN: CPT | Performed by: FAMILY MEDICINE

## 2021-07-27 RX ORDER — METFORMIN HYDROCHLORIDE 500 MG/1
500 TABLET, EXTENDED RELEASE ORAL
Qty: 90 TABLET | Refills: 3 | Status: SHIPPED | OUTPATIENT
Start: 2021-07-27 | End: 2022-09-14

## 2021-07-27 RX ORDER — LISINOPRIL 10 MG/1
10 TABLET ORAL DAILY
Qty: 90 TABLET | Refills: 3 | Status: SHIPPED | OUTPATIENT
Start: 2021-07-27 | End: 2022-09-14

## 2021-07-27 RX ORDER — CETIRIZINE HYDROCHLORIDE 10 MG/1
10 TABLET ORAL DAILY
Qty: 90 TABLET | Refills: 3 | OUTPATIENT
Start: 2021-07-27 | End: 2023-03-09

## 2021-07-27 RX ORDER — ATENOLOL 50 MG/1
50 TABLET ORAL DAILY
Qty: 90 TABLET | Refills: 2 | Status: SHIPPED | OUTPATIENT
Start: 2021-07-27 | End: 2022-06-14

## 2021-07-27 NOTE — PROGRESS NOTES
"Chief Complaint  Hypertension (3 month recheck)    Subjective          Marlee Ayala presents to [facility]  History of Present Illness    Patient presents today for follow up.  Patient is not check blood pressure at home.  She is compliant with her medications.  She is taking lisinopril 10 mg daily and atenolol 50 mg daily.  She has staying active in the pool this summer.  Patient does not have scheduled exercise.  She is watching her diet some.  Patient taking metformin  mg daily for prediabetes.  Tolerating this medication.  Has not started Saxenda yet, but does plan to start this week.  Patient wants referral to bariatric surgery/weight loss provider in [place].    Objective   Vital Signs:   /88   Pulse 76   Ht 175.3 cm (69\")   Wt (!) 147 kg (325 lb)   SpO2 98%   BMI 47.99 kg/m²     Physical Exam  Vitals reviewed.   Constitutional:       General: She is not in acute distress.     Appearance: She is well-developed.   HENT:      Head: Normocephalic and atraumatic.   Cardiovascular:      Rate and Rhythm: Normal rate and regular rhythm.      Heart sounds: Normal heart sounds. No murmur heard.     Pulmonary:      Effort: Pulmonary effort is normal. No respiratory distress.      Breath sounds: Normal breath sounds. No wheezing or rales.   Abdominal:      Palpations: Abdomen is soft.      Tenderness: There is no abdominal tenderness.   Skin:     General: Skin is warm and dry.   Neurological:      Mental Status: She is alert and oriented to person, place, and time.        Result Review :   The following data was reviewed by: Justina Arnold MD on 07/27/2021:  Common labs    Common Labsle 4/28/21 4/28/21 4/28/21 4/28/21    0734 0734 0734 0734   Glucose    113 (A)   BUN    11   Creatinine    0.69   eGFR Non African Am    92   Sodium    137   Potassium    4.0   Chloride    105   Calcium    8.9   Albumin    3.80   Total Bilirubin    0.4   Alkaline Phosphatase    57 "   AST (SGOT)    25   ALT (SGPT)    35 (A)   WBC 6.93      Hemoglobin 11.7 (A)      Hematocrit 36.6      Platelets 273      Total Cholesterol   183    Triglycerides   138    HDL Cholesterol   40    LDL Cholesterol    118 (A)    Hemoglobin A1C  6.00 (A)     (A) Abnormal value                     Trace lower extrem     Assessment and Plan    Diagnoses and all orders for this visit:    1. Class 3 severe obesity due to excess calories with serious comorbidity and body mass index (BMI) of 45.0 to 49.9 in adult (CMS/[facility]) (Primary)  -     Ambulatory Referral to Bariatric Surgery    2. Essential hypertension  -     lisinopril (PRINIVIL,ZESTRIL) 10 MG tablet; Take 1 tablet by mouth Daily.  Dispense: 90 tablet; Refill: 3  -     atenolol (Tenormin) 50 MG tablet; Take 1 tablet by mouth Daily.  Dispense: 90 tablet; Refill: 2    3. Prediabetes  -     metFORMIN ER (GLUCOPHAGE-XR) 500 MG 24 hr tablet; Take 1 tablet by mouth Daily With Breakfast.  Dispense: 90 tablet; Refill: 3    4. Seasonal allergies  -     cetirizine (zyrTEC) 10 MG tablet; Take 1 tablet by mouth Daily.  Dispense: 90 tablet; Refill: 3      Patient seen for follow up  Will start Saxenda to help with weight loss  Referral to bariatric surgery closer to her new workplace  Encouraged healthy diet  Encouraged exercise, pool walking may be a good option while it is still warm  Blood pressure controlled, continue current antihypertensive medications  Continue metformin for prediabetes, will monitor with labs every 6-12 months  Refill of Zyrtec provided for seasonal allergies        Follow Up   Return in about 6 months (around 1/27/2022) for Annual physical with pap, Recheck.  Patient was given instructions and counseling regarding her condition or for health maintenance advice. Please see specific information pulled into the AVS if appropriate.         This document has been electronically signed by Justina Arnold MD

## 2021-08-20 NOTE — PROGRESS NOTES
I have reviewed the notes, assessments, and/or procedures performed by Dr. Lim, I concur with her/his documentation of Marlee Ayala.    No

## 2021-10-08 NOTE — PATIENT INSTRUCTIONS
Dysuria  Dysuria is pain or discomfort while urinating. The pain or discomfort may be felt in the part of your body that drains urine from the bladder (urethra) or in the surrounding tissue of the genitals. The pain may also be felt in the groin area, lower abdomen, or lower back. You may have to urinate frequently or have the sudden feeling that you have to urinate (urgency). Dysuria can affect both men and women, but it is more common in women.  Dysuria can be caused by many different things, including:  · Urinary tract infection.  · Kidney stones or bladder stones.  · Certain sexually transmitted infections (STIs), such as chlamydia.  · Dehydration.  · Inflammation of the tissues of the vagina.  · Use of certain medicines.  · Use of certain soaps or scented products that cause irritation.  Follow these instructions at home:  General instructions  · Watch your condition for any changes.  · Urinate often. Avoid holding urine for long periods of time.  · After a bowel movement or urination, women should cleanse from front to back, using each tissue only once.  · Urinate after sexual intercourse.  · Keep all follow-up visits as told by your health care provider. This is important.  · If you had any tests done to find the cause of dysuria, it is up to you to get your test results. Ask your health care provider, or the department that is doing the test, when your results will be ready.  Eating and drinking    · Drink enough fluid to keep your urine pale yellow.  · Avoid caffeine, tea, and alcohol. They can irritate the bladder and make dysuria worse. In men, alcohol may irritate the prostate.  Medicines  · Take over-the-counter and prescription medicines only as told by your health care provider.  · If you were prescribed an antibiotic medicine, take it as told by your health care provider. Do not stop taking the antibiotic even if you start to feel better.  Contact a health care provider if:  · You have a  fever.  · You develop pain in your back or sides.  · You have nausea or vomiting.  · You have blood in your urine.  · You are not urinating as often as you usually do.  Get help right away if:  · Your pain is severe and not relieved with medicines.  · You cannot eat or drink without vomiting.  · You are confused.  · You have a rapid heartbeat while at rest.  · You have shaking or chills.  · You feel extremely weak.  Summary  · Dysuria is pain or discomfort while urinating. Many different conditions can lead to dysuria.  · If you have dysuria, you may have to urinate frequently or have the sudden feeling that you have to urinate (urgency).  · Watch your condition for any changes. Keep all follow-up visits as told by your health care provider.  · Make sure that you urinate often and drink enough fluid to keep your urine pale yellow.  This information is not intended to replace advice given to you by your health care provider. Make sure you discuss any questions you have with your health care provider.  Document Released: 09/15/2005 Document Revised: 11/30/2018 Document Reviewed: 10/04/2018  [employer] Patient Education © 2020 [employer]     (1) More than 48 hours/None

## 2021-10-11 DIAGNOSIS — R73.03 PREDIABETES: ICD-10-CM

## 2021-10-11 DIAGNOSIS — E66.01 CLASS 3 SEVERE OBESITY DUE TO EXCESS CALORIES WITH SERIOUS COMORBIDITY AND BODY MASS INDEX (BMI) OF 45.0 TO 49.9 IN ADULT (HCC): ICD-10-CM

## 2021-10-26 ENCOUNTER — TELEPHONE (OUTPATIENT)
Dept: FAMILY MEDICINE CLINIC | Facility: CLINIC | Age: 46
End: 2021-10-26

## 2021-10-26 DIAGNOSIS — R73.03 PREDIABETES: ICD-10-CM

## 2021-10-26 DIAGNOSIS — E66.01 CLASS 3 SEVERE OBESITY DUE TO EXCESS CALORIES WITH SERIOUS COMORBIDITY AND BODY MASS INDEX (BMI) OF 45.0 TO 49.9 IN ADULT (HCC): ICD-10-CM

## 2021-10-26 RX ORDER — PEN NEEDLE, DIABETIC 31 GX5/16"
NEEDLE, DISPOSABLE MISCELLANEOUS
Qty: 100 EACH | Refills: 1 | Status: SHIPPED | OUTPATIENT
Start: 2021-10-26 | End: 2022-06-07 | Stop reason: SDUPTHER

## 2021-10-26 NOTE — TELEPHONE ENCOUNTER
You wanted to know how her weight los was doing on the Saxenda.   Next Tressa OV 01/28/2022    She said to be honest she does not get on the scale.   She said her clothes are fitting better and she feels inches are coming off.   She said she can tell a difference

## 2022-01-28 ENCOUNTER — OFFICE VISIT (OUTPATIENT)
Dept: FAMILY MEDICINE CLINIC | Facility: CLINIC | Age: 47
End: 2022-01-28

## 2022-01-28 VITALS
OXYGEN SATURATION: 99 % | DIASTOLIC BLOOD PRESSURE: 88 MMHG | SYSTOLIC BLOOD PRESSURE: 134 MMHG | WEIGHT: 293 LBS | HEART RATE: 76 BPM | HEIGHT: 69 IN | BODY MASS INDEX: 43.4 KG/M2

## 2022-01-28 DIAGNOSIS — Z12.11 ENCOUNTER FOR SCREENING FOR MALIGNANT NEOPLASM OF COLON: ICD-10-CM

## 2022-01-28 DIAGNOSIS — Z12.31 ENCOUNTER FOR SCREENING MAMMOGRAM FOR MALIGNANT NEOPLASM OF BREAST: ICD-10-CM

## 2022-01-28 DIAGNOSIS — R73.03 PREDIABETES: ICD-10-CM

## 2022-01-28 DIAGNOSIS — Z00.00 ANNUAL PHYSICAL EXAM: Primary | ICD-10-CM

## 2022-01-28 DIAGNOSIS — E66.01 CLASS 3 SEVERE OBESITY DUE TO EXCESS CALORIES WITH SERIOUS COMORBIDITY AND BODY MASS INDEX (BMI) OF 40.0 TO 44.9 IN ADULT: ICD-10-CM

## 2022-01-28 PROCEDURE — 99396 PREV VISIT EST AGE 40-64: CPT | Performed by: FAMILY MEDICINE

## 2022-01-28 NOTE — PROGRESS NOTES
"Chief Complaint  Annual Exam    Subjective          [facility]lee [facility] [place]  History of Present Illness    Marlee Ayala is a 46 y.o. year old presenting to be seen for her annual exam.      Concerns: None new.  Has been losing weight, using Saxenda.  Blood pressure controlled with lisinopril 10 mg daily and atenolol 50 mg daily.     She is sexually active with .  In the past 12 months there has not been new sexual partners.  Condoms ARE typically used.     Working on healthy diet and regular exercise.  Wears seat belt:yes.  Concerns about domestic violence: no.  Taking Vit D and Calcium:takes multivitamin  Last colonoscopy or FIT test: never  Last DEXA: n/a  Last PAP: schedule pap, last one many years  Last Mammo: never  Immunization status: up to date and documented.  LMP: Patient's last menstrual period was 01/19/2022 (approximate).  Periods Regular: yes.    The following portions of the patient's history were reviewed and updated as appropriate:problem list, current medications, allergies, past family history, past medical history, past social history and past surgical history.    Objective   Vital Signs:   /88   Pulse 76   Ht 175.3 cm (69\")   Wt (!) 138 kg (304 lb)   SpO2 99%   BMI 44.89 kg/m²     Physical Exam  Vitals reviewed.   Constitutional:       General: She is not in acute distress.     Appearance: She is well-developed.   HENT:      Right Ear: Tympanic membrane and ear canal normal.      Left Ear: Tympanic membrane and ear canal normal.   Eyes:      Extraocular Movements: Extraocular movements intact.      Pupils: Pupils are equal, round, and reactive to light.   Cardiovascular:      Rate and Rhythm: Normal rate and regular rhythm.      Heart sounds: Normal heart sounds. No murmur heard.      Pulmonary:      Effort: Pulmonary effort is normal. No respiratory distress.      Breath sounds: Normal breath sounds. " No wheezing or rales.   Abdominal:      Palpations: Abdomen is soft.      Tenderness: There is no abdominal tenderness.   Musculoskeletal:      Cervical back: Neck supple.   Skin:     General: Skin is warm and dry.      Findings: No rash.   Neurological:      Mental Status: She is alert and oriented to person, place, and time.        Result Review :   The following data was reviewed by: Justina Arnold MD on 01/28/2022:  Common labs    Common Labsle 4/28/21 4/28/21 4/28/21 4/28/21    0734 0734 0734 0734   Glucose    113 (A)   BUN    11   Creatinine    0.69   eGFR Non African Am    92   Sodium    137   Potassium    4.0   Chloride    105   Calcium    8.9   Albumin    3.80   Total Bilirubin    0.4   Alkaline Phosphatase    57   AST (SGOT)    25   ALT (SGPT)    35 (A)   WBC 6.93      Hemoglobin 11.7 (A)      Hematocrit 36.6      Platelets 273      Total Cholesterol   183    Triglycerides   138    HDL Cholesterol   40    LDL Cholesterol    118 (A)    Hemoglobin A1C  6.00 (A)     (A) Abnormal value                      Assessment and Plan    Diagnoses and all orders for this visit:    1. Annual physical exam (Primary)  -     Lipid Panel; Future  -     CBC & Differential; Future  -     Comprehensive Metabolic Panel; Future    2. Prediabetes  -     Hemoglobin A1c; Future    3. Class 3 severe obesity due to excess calories with serious comorbidity and body mass index (BMI) of 40.0 to 44.9 in adult (HCC)    4. Encounter for screening mammogram for malignant neoplasm of breast  -     Mammo Screening Digital Tomosynthesis Bilateral With CAD; Future    5. Encounter for screening for malignant neoplasm of colon  -     Cologuard - Stool, Per Rectum; Future       Patient seen today for annual exam  She is overall doing well  Due for blood work, ordered CBC, CMP, Lipid panel and HgbA1c today  Patient has had weight loss since last office visit    Wt Readings from Last 3 Encounters:   01/28/22 (!) 138 kg (304 lb)   07/27/21 (!)  147 kg (325 lb)   04/27/21 (!) 149 kg (329 lb)     Patient's Body mass index is 44.89 kg/m². indicating that she is morbidly obese (BMI > 40 or > 35 with obesity - related health condition). Obesity-related health conditions include the following: hypertension and prediabetes. Obesity is improving with lifestyle modifications and medication.  BMI is is above average; BMI management plan is completed. We discussed diet and exercise, continued use of Saxenda.    Patient due for pap smear, will call OB/Gyn to get set up  Due for mammogram, ordered  Due for colon cancer screening, options discussed  Patient elects for cologuard         Follow Up   Return in about 6 months (around 7/28/2022) for Recheck.  Patient was given instructions and counseling regarding her condition or for health maintenance advice. Please see specific information pulled into the AVS if appropriate.         This document has been electronically signed by Justina Arnold MD

## 2022-02-07 ENCOUNTER — TELEPHONE (OUTPATIENT)
Dept: FAMILY MEDICINE CLINIC | Facility: CLINIC | Age: 47
End: 2022-02-07

## 2022-02-07 ENCOUNTER — LAB (OUTPATIENT)
Dept: LAB | Facility: OTHER | Age: 47
End: 2022-02-07

## 2022-02-07 DIAGNOSIS — R73.03 PREDIABETES: ICD-10-CM

## 2022-02-07 DIAGNOSIS — Z00.00 ANNUAL PHYSICAL EXAM: ICD-10-CM

## 2022-02-07 LAB
ALBUMIN SERPL-MCNC: 4.1 G/DL (ref 3.5–5)
ALBUMIN/GLOB SERPL: 1.2 G/DL (ref 1.1–1.8)
ALP SERPL-CCNC: 63 U/L (ref 38–126)
ALT SERPL W P-5'-P-CCNC: 33 U/L
ANION GAP SERPL CALCULATED.3IONS-SCNC: 9 MMOL/L (ref 5–15)
AST SERPL-CCNC: 27 U/L (ref 14–36)
BASOPHILS # BLD AUTO: 0.02 10*3/MM3 (ref 0–0.2)
BASOPHILS NFR BLD AUTO: 0.2 % (ref 0–1.5)
BILIRUB SERPL-MCNC: 0.3 MG/DL (ref 0.2–1.3)
BUN SERPL-MCNC: 15 MG/DL (ref 7–23)
BUN/CREAT SERPL: 25 (ref 7–25)
CALCIUM SPEC-SCNC: 9 MG/DL (ref 8.4–10.2)
CHLORIDE SERPL-SCNC: 103 MMOL/L (ref 101–112)
CHOLEST SERPL-MCNC: 183 MG/DL (ref 150–200)
CO2 SERPL-SCNC: 26 MMOL/L (ref 22–30)
CREAT SERPL-MCNC: 0.6 MG/DL (ref 0.52–1.04)
DEPRECATED RDW RBC AUTO: 46.4 FL (ref 37–54)
EOSINOPHIL # BLD AUTO: 0.23 10*3/MM3 (ref 0–0.4)
EOSINOPHIL NFR BLD AUTO: 2.7 % (ref 0.3–6.2)
ERYTHROCYTE [DISTWIDTH] IN BLOOD BY AUTOMATED COUNT: 13.8 % (ref 12.3–15.4)
GFR SERPL CREATININE-BSD FRML MDRD: 108 ML/MIN/1.73 (ref 58–135)
GLOBULIN UR ELPH-MCNC: 3.4 GM/DL (ref 2.3–3.5)
GLUCOSE SERPL-MCNC: 121 MG/DL (ref 70–99)
HBA1C MFR BLD: 5.87 % (ref 4.8–5.6)
HCT VFR BLD AUTO: 39.4 % (ref 34–46.6)
HDLC SERPL-MCNC: 50 MG/DL (ref 40–59)
HGB BLD-MCNC: 12.1 G/DL (ref 12–15.9)
LDLC SERPL CALC-MCNC: 101 MG/DL
LDLC/HDLC SERPL: 1.92 {RATIO} (ref 0–3.22)
LYMPHOCYTES # BLD AUTO: 1.22 10*3/MM3 (ref 0.7–3.1)
LYMPHOCYTES NFR BLD AUTO: 14.3 % (ref 19.6–45.3)
MCH RBC QN AUTO: 29.4 PG (ref 26.6–33)
MCHC RBC AUTO-ENTMCNC: 30.7 G/DL (ref 31.5–35.7)
MCV RBC AUTO: 95.9 FL (ref 79–97)
MONOCYTES # BLD AUTO: 0.59 10*3/MM3 (ref 0.1–0.9)
MONOCYTES NFR BLD AUTO: 6.9 % (ref 5–12)
NEUTROPHILS NFR BLD AUTO: 6.48 10*3/MM3 (ref 1.7–7)
NEUTROPHILS NFR BLD AUTO: 75.9 % (ref 42.7–76)
PLATELET # BLD AUTO: 319 10*3/MM3 (ref 140–450)
PMV BLD AUTO: 10.3 FL (ref 6–12)
POTASSIUM SERPL-SCNC: 4.1 MMOL/L (ref 3.4–5)
PROT SERPL-MCNC: 7.5 G/DL (ref 6.3–8.6)
RBC # BLD AUTO: 4.11 10*6/MM3 (ref 3.77–5.28)
SODIUM SERPL-SCNC: 138 MMOL/L (ref 137–145)
TRIGL SERPL-MCNC: 185 MG/DL
VLDLC SERPL-MCNC: 32 MG/DL (ref 5–40)
WBC NRBC COR # BLD: 8.54 10*3/MM3 (ref 3.4–10.8)

## 2022-02-07 PROCEDURE — 85025 COMPLETE CBC W/AUTO DIFF WBC: CPT | Performed by: FAMILY MEDICINE

## 2022-02-07 PROCEDURE — 80061 LIPID PANEL: CPT | Performed by: FAMILY MEDICINE

## 2022-02-07 PROCEDURE — 36415 COLL VENOUS BLD VENIPUNCTURE: CPT | Performed by: FAMILY MEDICINE

## 2022-02-07 PROCEDURE — 80053 COMPREHEN METABOLIC PANEL: CPT | Performed by: FAMILY MEDICINE

## 2022-02-07 PROCEDURE — 83036 HEMOGLOBIN GLYCOSYLATED A1C: CPT | Performed by: FAMILY MEDICINE

## 2022-02-08 NOTE — TELEPHONE ENCOUNTER
Please let patient know labs are looking good.  HgbA1c and cholesterol both improved from last check.  ThanksHUMAIRA

## 2022-02-22 DIAGNOSIS — E66.01 CLASS 3 SEVERE OBESITY DUE TO EXCESS CALORIES WITH SERIOUS COMORBIDITY AND BODY MASS INDEX (BMI) OF 45.0 TO 49.9 IN ADULT: ICD-10-CM

## 2022-02-22 DIAGNOSIS — R73.03 PREDIABETES: ICD-10-CM

## 2022-02-22 RX ORDER — LIRAGLUTIDE 6 MG/ML
INJECTION, SOLUTION SUBCUTANEOUS
Qty: 15 ML | Refills: 0 | Status: SHIPPED | OUTPATIENT
Start: 2022-02-22 | End: 2022-03-29

## 2022-03-22 ENCOUNTER — TELEMEDICINE (OUTPATIENT)
Dept: FAMILY MEDICINE CLINIC | Facility: TELEHEALTH | Age: 47
End: 2022-03-22

## 2022-03-22 DIAGNOSIS — J06.9 UPPER RESPIRATORY TRACT INFECTION, UNSPECIFIED TYPE: Primary | ICD-10-CM

## 2022-03-22 PROCEDURE — 99213 OFFICE O/P EST LOW 20 MIN: CPT | Performed by: NURSE PRACTITIONER

## 2022-03-22 RX ORDER — FLURBIPROFEN SODIUM 0.3 MG/ML
SOLUTION/ DROPS OPHTHALMIC
COMMUNITY
Start: 2022-03-05

## 2022-03-22 RX ORDER — DOXYCYCLINE HYCLATE 100 MG/1
100 CAPSULE ORAL 2 TIMES DAILY
Qty: 20 CAPSULE | Refills: 0 | Status: SHIPPED | OUTPATIENT
Start: 2022-03-22 | End: 2022-04-01

## 2022-03-22 NOTE — PATIENT INSTRUCTIONS
Drink plenty of water  Over the counter pain relievers okay   If symptoms do not improve in 3-5 days start the anti biotic. If they continue for 3-5 more days after starting the antibiotic, follow up with your primary care provider or urgent care  This is likely viral and will not respond to an antibiotic. Symptoms typically last 5-10 days and treating the symptoms is best.   Hold the antibiotic for 3-5 days and if no better start taking.     Upper Respiratory Infection, Adult  An upper respiratory infection (URI) is a common viral infection of the nose, throat, and upper air passages that lead to the lungs. The most common type of URI is the common cold. URIs usually get better on their own, without medical treatment.  What are the causes?  A URI is caused by a virus. You may catch a virus by:  Breathing in droplets from an infected person's cough or sneeze.  Touching something that has been exposed to the virus (contaminated) and then touching your mouth, nose, or eyes.  What increases the risk?  You are more likely to get a URI if:  You are very young or very old.  It is lucille or winter.  You have close contact with others, such as at a , school, or health care facility.  You smoke.  You have long-term (chronic) heart or lung disease.  You have a weakened disease-fighting (immune) system.  You have nasal allergies or asthma.  You are experiencing a lot of stress.  You work in an area that has poor air circulation.  You have poor nutrition.  What are the signs or symptoms?  A URI usually involves some of the following symptoms:  Runny or stuffy (congested) nose.  Sneezing.  Cough.  Sore throat.  Headache.  Fatigue.  Fever.  Loss of appetite.  Pain in your forehead, behind your eyes, and over your cheekbones (sinus pain).  Muscle aches.  Redness or irritation of the eyes.  Pressure in the ears or face.  How is this diagnosed?  This condition may be diagnosed based on your medical history and symptoms, and a  physical exam. Your health care provider may use a cotton swab to take a mucus sample from your nose (nasal swab). This sample can be tested to determine what virus is causing the illness.  How is this treated?  URIs usually get better on their own within 7-10 days. You can take steps at home to relieve your symptoms. Medicines cannot cure URIs, but your health care provider may recommend certain medicines to help relieve symptoms, such as:  Over-the-counter cold medicines.  Cough suppressants. Coughing is a type of defense against infection that helps to clear the respiratory system, so take these medicines only as recommended by your health care provider.  Fever-reducing medicines.  Follow these instructions at home:  Activity  Rest as needed.  If you have a fever, stay home from work or school until your fever is gone or until your health care provider says you are no longer contagious. Your health care provider may have you wear a face mask to prevent your infection from spreading.  Relieving symptoms  Gargle with a salt-water mixture 3-4 times a day or as needed. To make a salt-water mixture, completely dissolve ½-1 tsp of salt in 1 cup of warm water.  Use a cool-mist humidifier to add moisture to the air. This can help you breathe more easily.  Eating and drinking    Drink enough fluid to keep your urine pale yellow.  Eat soups and other clear broths.    General instructions    Take over-the-counter and prescription medicines only as told by your health care provider. These include cold medicines, fever reducers, and cough suppressants.  Do not use any products that contain nicotine or tobacco, such as cigarettes and e-cigarettes. If you need help quitting, ask your health care provider.  Stay away from secondhand smoke.  Stay up to date on all immunizations, including the yearly (annual) flu vaccine.  Keep all follow-up visits as told by your health care provider. This is important.    How to prevent the  spread of infection to others    URIs can be passed from person to person (are contagious). To prevent the infection from spreading:  Wash your hands often with soap and water. If soap and water are not available, use hand .  Avoid touching your mouth, face, eyes, or nose.  Cough or sneeze into a tissue or your sleeve or elbow instead of into your hand or into the air.    Contact a health care provider if:  You are getting worse instead of better.  You have a fever or chills.  Your mucus is brown or red.  You have yellow or brown discharge coming from your nose.  You have pain in your face, especially when you bend forward.  You have swollen neck glands.  You have pain while swallowing.  You have white areas in the back of your throat.  Get help right away if:  You have shortness of breath that gets worse.  You have severe or persistent:  Headache.  Ear pain.  Sinus pain.  Chest pain.  You have chronic lung disease along with any of the following:  Wheezing.  Prolonged cough.  Coughing up blood.  A change in your usual mucus.  You have a stiff neck.  You have changes in your:  Vision.  Hearing.  Thinking.  Mood.  Summary  An upper respiratory infection (URI) is a common infection of the nose, throat, and upper air passages that lead to the lungs.  A URI is caused by a virus.  URIs usually get better on their own within 7-10 days.  Medicines cannot cure URIs, but your health care provider may recommend certain medicines to help relieve symptoms.  This information is not intended to replace advice given to you by your health care provider. Make sure you discuss any questions you have with your health care provider.  Document Revised: 12/26/2019 Document Reviewed: 08/03/2018  [employer] Patient Education © 2021 [employer]    Antibiotic Resistance  Antibiotics are medicines used to treat infections that are caused by bacteria. Antibiotic resistance means that the medicine once used to treat an infection no  longer works against the bacteria.  Resistance can develop when antibiotics are given in response to bacterial illnesses that do not need antibiotics, or when antibiotics are incorrectly given for viral illnesses, like colds or the flu (influenza). It can also happen when a person uses antibiotics in the wrong way and some of the infectious bacteria develop resistance to the antibiotic. These bacteria may grow and cause infections that are resistant to various types of antibiotics. If this happens, the resistant bacteria can spread infections that are difficult to treat.  What are the causes?  Antibiotic resistance happens when bacteria come into contact with an antibiotic over and over again. Over time, the bacteria become resistant to the antibiotic.  What increases the risk?  This condition is more likely to develop in people who:  Are repeatedly given antibiotics to treat viral infections.  Do not take their antibiotic medicine exactly as prescribed, such as not finishing all of the medicine.  Need to take antibiotics often because of a long-term medical condition.  Take medicines that weaken their body's defense system (immune system).  Have surgery or need a procedure that replaces some of the work that healthy kidneys do (dialysis).  Are older adults.  Additional risk factors include:  Having a type of infection that is more likely to be caused by resistant bacteria. These include certain:  Skin infections.  STIs (sexually transmitted infections).  Respiratory infections.  Infections of the lining of the brain and spinal cord (meningitis).  Gastrointestinal infections.  Eating foods from animals that were treated with antibiotics. Antibiotic-resistant bacteria can be passed through the food.  Having an organ transplant or receiving treatment for cancer.  Living with or caring for someone with an antibiotic-resistant infection.  Being hospitalized for a long time or living in a long-term care facility.  What  are the signs or symptoms?  The main symptom of this condition is having an infection that does not improve with normal treatment. The specific signs and symptoms that you have will depend on the type of infection, but they may include:  A fever.  Warmth, redness, and tenderness around a wound or incision.  Brown, yellow, or green drainage from a wound or incision.  A bad smell coming from a wound or incision.  Nausea, vomiting, and abdominal pain.  How is this diagnosed?  This condition may be diagnosed based on your symptoms and medical history. Your health care provider may suspect antibiotic resistance if your condition does not improve after you have been treated for an infection.  You may also have other tests, including:  Analysis of a fluid or stool (feces) sample. This is done to identify bacteria under a microscope and determine what type of antibiotic will work against them (culture and sensitivity).  Other blood tests and imaging tests. These are done to check if your infection has spread or become more serious.  How is this treated?  Treatment for this condition depends on the nature of the specific infection.  Treatment may include oral antibiotics that kill more types of bacteria (broad spectrum).  Serious antibiotic-resistant infections may need to be treated in the hospital. In severe cases, this may include:  Surgery to remove infected or damaged tissue.  Antibiotics or other medicines given through an IV.  Follow these instructions at home:  Taking antibiotics correctly    Understand when antibiotics are needed and when they are not needed.  Do not ask for an antibiotic prescription if you have been diagnosed with a viral illness. Antibiotic medicine will not make your illness go away faster. Common viral illnesses include an ear infection, a sinus infection, the stomach flu, or bronchitis.  Do not take antibiotics that are left over from a previous prescription.  Do not take antibiotics that were  prescribed for someone else.  If you are prescribed an antibiotic:  Take it exactly as told by your health care provider. Do not stop taking the medicine even if you start to feel better.  If you have been taking it for more than 10 days, ask your health care provider or pharmacist if you should keep taking it.  Do not save unused antibiotics to use at a later date. Get rid of unused medicine as told by your health care provider or pharmacist.     Preventing infection         If you have an infection, avoid close contact with people around you.  Avoid using personal items that are used by other people, such as towels, razors, or bedding.  Regularly disinfect doorknobs, food preparation surfaces, and bathrooms with bleach-containing products or solutions.  Wash your hands often with soap and water for at least 20 seconds. In particular, wash your hands:  After using the bathroom.  Before and after caring for a wound or incision.  Before and after preparing food.  Stay up to date on vaccinations. Many vaccines prevent illnesses that are treated with antibiotics.  Contact a health care provider if you:  Have a fever or chills.  Are taking a new antibiotic and you are not getting better after a few days.  Have three or more periods of diarrhea after starting a new antibiotic.  Have increased warmth, redness, or tenderness around a wound or incision.  Have brown, yellow, or green drainage from a wound or incision.  Have a bad-smelling wound or incision.  Have new or worsening nausea, vomiting, or abdominal pain.  Get help right away if:  You develop a rash.  Your mouth or tongue itches.  You have a tight feeling in your throat.  You have trouble breathing.  You have chest pain or tightness.  You are dizzy or you faint.  These symptoms may represent a serious problem that is an emergency. Do not wait to see if the symptoms will go away. Get medical help right away. Call your local emergency services (911 in the [place] Do  not drive yourself to the hospital.  Summary  Antibiotics are medicines used to treat infections that are caused by bacteria.  Antibiotic resistance means that the medicine once used to treat an infection no longer works against the bacteria. These bacteria may grow and cause infections that are resistant to various types of antibiotics. If this happens, the resistant bacteria can spread infection that is difficult to treat.  Understand when antibiotics are needed and when they are not needed.  If you are prescribed an antibiotic, take it exactly as told by your health care provider. Do not stop taking the medicine even if you start to feel better.  Get help right away if you develop a rash.  This information is not intended to replace advice given to you by your health care provider. Make sure you discuss any questions you have with your health care provider.  Document Revised: 10/30/2020 Document Reviewed: 10/30/2020  [employer] Patient Education © 2021 [employer]

## 2022-03-22 NOTE — PROGRESS NOTES
You have chosen to receive care through a telehealth visit.  Do you consent to use a video/audio connection for your medical care today? Yes     CHIEF COMPLAINT  Chief Complaint   Patient presents with   • Cough         HPI  Marlee Ayala is a 47 y.o. female  presents with complaint of started with stopped up nose and sore throat and now developing a cough.     Review of Systems   Constitutional: Negative for chills, diaphoresis, fatigue and fever.   HENT: Positive for congestion, postnasal drip, rhinorrhea, sinus pressure and sore throat. Negative for sneezing.    Respiratory: Positive for cough. Negative for chest tightness, shortness of breath and wheezing.    Cardiovascular: Negative for chest pain.   Gastrointestinal: Negative for diarrhea, nausea and vomiting.   Musculoskeletal: Negative for myalgias.   Neurological: Negative for headaches.   Hematological: Positive for adenopathy.       Past Medical History:   Diagnosis Date   • Abdominal pain     unspecified   • Cholelithiasis    • Family history of blood clots     Maternal    • Gallbladder problem     Gallbladder surgery removal 2001    • Heartburn    • History of Papanicolaou smear of cervix 06/22/2005    postpartum satisfactory   • Hypertension    • Obesity    • Seasonal allergies        Family History   Problem Relation Age of Onset   • Diabetes Father    • Stroke Father    • Hypertension Brother    • Prostate cancer Maternal Grandfather        Social History     Socioeconomic History   • Marital status:    Tobacco Use   • Smoking status: Never Smoker   • Smokeless tobacco: Never Used   Vaping Use   • Vaping Use: Never used   Substance and Sexual Activity   • Alcohol use: No   • Drug use: No   • Sexual activity: Yes         LMP 03/21/2022 (Exact Date)   Breastfeeding No     PHYSICAL EXAM  Physical Exam   Constitutional: She is oriented to person, place, and time. She appears well-developed and well-nourished. She does not have a sickly  appearance. She does not appear ill. No distress.   HENT:   Head: Normocephalic and atraumatic.   Eyes: EOM are normal.   Neck: Neck normal appearance.  Pulmonary/Chest: Effort normal.  No respiratory distress.  Neurological: She is alert and oriented to person, place, and time.   Skin: Skin is dry.   Psychiatric: She has a normal mood and affect.         Diagnoses and all orders for this visit:    1. Upper respiratory tract infection, unspecified type (Primary)    Other orders  -     doxycycline (VIBRAMYCIN) 100 MG capsule; Take 1 capsule by mouth 2 (Two) Times a Day for 10 days.  Dispense: 20 capsule; Refill: 0    She usually gets an antibiotic every year for this and feels it is necessary to get better.   Discussed this is likely viral and will not respond to an antibiotic. Symptoms typically last 5-10 days and treating the symptoms is best.   Hold the antibiotic for 3-5 days and if no better start taking.       FOLLOW-UP  As discussed during visit with PCP/[facility] if no improvement or Urgent Care/Emergency Department if worsening of symptoms    Patient verbalizes understanding of medication dosage, comfort measures, instructions for treatment and follow-up.    TRISH Gonzalez  03/22/2022  09:02 EDT    This visit was performed via Telehealth.  This patient has been instructed to follow-up with their primary care provider if their symptoms worsen or the treatment provided does not resolve their illness.

## 2022-03-29 DIAGNOSIS — E66.01 CLASS 3 SEVERE OBESITY DUE TO EXCESS CALORIES WITH SERIOUS COMORBIDITY AND BODY MASS INDEX (BMI) OF 45.0 TO 49.9 IN ADULT: ICD-10-CM

## 2022-03-29 DIAGNOSIS — R73.03 PREDIABETES: ICD-10-CM

## 2022-03-29 RX ORDER — LIRAGLUTIDE 6 MG/ML
INJECTION, SOLUTION SUBCUTANEOUS
Qty: 15 ML | Refills: 0 | Status: SHIPPED | OUTPATIENT
Start: 2022-03-29 | End: 2022-05-03

## 2022-05-03 DIAGNOSIS — R73.03 PREDIABETES: ICD-10-CM

## 2022-05-03 DIAGNOSIS — E66.01 CLASS 3 SEVERE OBESITY DUE TO EXCESS CALORIES WITH SERIOUS COMORBIDITY AND BODY MASS INDEX (BMI) OF 45.0 TO 49.9 IN ADULT: ICD-10-CM

## 2022-05-03 RX ORDER — LIRAGLUTIDE 6 MG/ML
INJECTION, SOLUTION SUBCUTANEOUS
Qty: 15 ML | Refills: 0 | Status: SHIPPED | OUTPATIENT
Start: 2022-05-03 | End: 2022-06-07 | Stop reason: SDUPTHER

## 2022-06-07 DIAGNOSIS — E66.01 CLASS 3 SEVERE OBESITY DUE TO EXCESS CALORIES WITH SERIOUS COMORBIDITY AND BODY MASS INDEX (BMI) OF 45.0 TO 49.9 IN ADULT: ICD-10-CM

## 2022-06-07 DIAGNOSIS — R73.03 PREDIABETES: ICD-10-CM

## 2022-06-07 RX ORDER — PEN NEEDLE, DIABETIC 31 GX5/16"
NEEDLE, DISPOSABLE MISCELLANEOUS
Qty: 100 EACH | Refills: 1 | Status: SHIPPED | OUTPATIENT
Start: 2022-06-07

## 2022-06-07 RX ORDER — LIRAGLUTIDE 6 MG/ML
3 INJECTION, SOLUTION SUBCUTANEOUS DAILY
Qty: 15 ML | Refills: 0 | Status: SHIPPED | OUTPATIENT
Start: 2022-06-07 | End: 2022-07-12

## 2022-06-14 DIAGNOSIS — I10 ESSENTIAL HYPERTENSION: ICD-10-CM

## 2022-06-14 RX ORDER — ATENOLOL 50 MG/1
TABLET ORAL
Qty: 90 TABLET | Refills: 1 | Status: SHIPPED | OUTPATIENT
Start: 2022-06-14 | End: 2022-12-15

## 2022-07-12 DIAGNOSIS — E66.01 CLASS 3 SEVERE OBESITY DUE TO EXCESS CALORIES WITH SERIOUS COMORBIDITY AND BODY MASS INDEX (BMI) OF 45.0 TO 49.9 IN ADULT: ICD-10-CM

## 2022-07-12 DIAGNOSIS — R73.03 PREDIABETES: ICD-10-CM

## 2022-07-12 RX ORDER — LIRAGLUTIDE 6 MG/ML
INJECTION, SOLUTION SUBCUTANEOUS
Qty: 15 ML | Refills: 0 | Status: SHIPPED | OUTPATIENT
Start: 2022-07-12 | End: 2022-07-25 | Stop reason: SDUPTHER

## 2022-07-25 ENCOUNTER — OFFICE VISIT (OUTPATIENT)
Dept: FAMILY MEDICINE CLINIC | Facility: CLINIC | Age: 47
End: 2022-07-25

## 2022-07-25 VITALS
RESPIRATION RATE: 18 BRPM | BODY MASS INDEX: 43.4 KG/M2 | OXYGEN SATURATION: 99 % | DIASTOLIC BLOOD PRESSURE: 80 MMHG | SYSTOLIC BLOOD PRESSURE: 122 MMHG | HEART RATE: 78 BPM | WEIGHT: 293 LBS | HEIGHT: 69 IN

## 2022-07-25 DIAGNOSIS — R73.03 PREDIABETES: ICD-10-CM

## 2022-07-25 DIAGNOSIS — I10 ESSENTIAL HYPERTENSION: ICD-10-CM

## 2022-07-25 DIAGNOSIS — E66.01 CLASS 3 SEVERE OBESITY DUE TO EXCESS CALORIES WITH SERIOUS COMORBIDITY AND BODY MASS INDEX (BMI) OF 45.0 TO 49.9 IN ADULT: Primary | ICD-10-CM

## 2022-07-25 PROCEDURE — 99214 OFFICE O/P EST MOD 30 MIN: CPT | Performed by: FAMILY MEDICINE

## 2022-07-25 RX ORDER — LIRAGLUTIDE 6 MG/ML
3 INJECTION, SOLUTION SUBCUTANEOUS DAILY
Qty: 18 ML | Refills: 5 | OUTPATIENT
Start: 2022-07-25 | End: 2023-03-09

## 2022-07-25 NOTE — PROGRESS NOTES
"Chief Complaint  Med Refill    Subjective    History of Present Illness {CC  Problem List  Visit  Diagnosis   Encounters  Notes  Medications  Labs  Result Review Imaging  Media :23}     Marlee Ayala presents to [facility] [place] for     Chief Complaint   Patient presents with   • Med Refill      Patient seen today for follow up.  Notes that she has been feeling ok.  Had steady weight loss accordinging to clothing that she is wearing, she does not weigh at home.  She has been eating smaller portion sizes.  She stays more active during the summer months, but does not have any formal/schedule exercise routine.  She is taking saxenda 3 mg daily, only nausea when she eats too much or eats the wrong foods.  Would like to consider weekly medication in this class at some point.  Blood pressure has been controlled with lisinopril and atenolol.     Wt Readings from Last 3 Encounters:   07/25/22 133 kg (293 lb 9.6 oz)   01/28/22 (!) 138 kg (304 lb)       Current Outpatient Medications:   •  atenolol (TENORMIN) 50 MG tablet, TAKE 1 TABLET EVERY DAY, Disp: 90 tablet, Rfl: 1  •  B-D UF III MINI PEN NEEDLES 31G X 5 MM misc, , Disp: , Rfl:   •  Insulin Pen Needle (Pen Needles 5/16\") 31G X 8 MM misc, Use one daily with Saxenda Injections, Disp: 100 each, Rfl: 1  •  lisinopril (PRINIVIL,ZESTRIL) 10 MG tablet, Take 1 tablet by mouth Daily., Disp: 90 tablet, Rfl: 3  •  metFORMIN ER (GLUCOPHAGE-XR) 500 MG 24 hr tablet, Take 1 tablet by mouth Daily With Breakfast., Disp: 90 tablet, Rfl: 3  •  predniSONE (DELTASONE) 10 MG tablet, Take 1 tablet by mouth Daily., Disp: 4 tablet, Rfl: 0  •  Saxenda 18 MG/3ML injection pen, INJECT 3MG UNDER THE SKIN INTO THE APPROPRIATE AREA AS DIRECTED EVERY DAY, Disp: 15 mL, Rfl: 0  •  cetirizine (zyrTEC) 10 MG tablet, Take 1 tablet by mouth Daily., Disp: 90 tablet, Rfl: 3     Objective       Vital Signs:   /80   Pulse 78   Resp 18   Ht " "175.3 cm (69\")   Wt 133 kg (293 lb 9.6 oz)   SpO2 99%   BMI 43.36 kg/m²     Physical Exam  Vitals reviewed.   Constitutional:       General: She is not in acute distress.     Appearance: She is well-developed.   Cardiovascular:      Rate and Rhythm: Normal rate and regular rhythm.      Heart sounds: Normal heart sounds. No murmur heard.  Pulmonary:      Effort: Pulmonary effort is normal. No respiratory distress.      Breath sounds: Normal breath sounds. No wheezing or rales.   Abdominal:      Palpations: Abdomen is soft.      Tenderness: There is no abdominal tenderness.   Skin:     General: Skin is warm and dry.   Neurological:      Mental Status: She is alert and oriented to person, place, and time.        Result Review :{ Labs  Result Review  Imaging  Med Tab  Media :23}   The following data was reviewed by: Justina Arnold MD on 07/25/2022    Common labs    Common Labsle 2/7/22 2/7/22 2/7/22 2/7/22    0840 0840 0840 0840   Glucose   121 (A)    BUN   15    Creatinine   0.60    eGFR Non African Am   108    Sodium   138    Potassium   4.1    Chloride   103    Calcium   9.0    Albumin   4.10    Total Bilirubin   0.3    Alkaline Phosphatase   63    AST (SGOT)   27    ALT (SGPT)   33    WBC 8.54      Hemoglobin 12.1      Hematocrit 39.4      Platelets 319      Total Cholesterol  183     Triglycerides  185 (A)     HDL Cholesterol  50     LDL Cholesterol   101 (A)     Hemoglobin A1C    5.87 (A)   (A) Abnormal value                    Assessment and Plan {CC Problem List  Visit Diagnosis  ROS  Review (Popup)  Health Maintenance  Quality  BestPractice  Medications  SmartSets  SnapShot Encounters  Media :23}   Diagnoses and all orders for this visit:    1. Class 3 severe obesity due to excess calories with serious comorbidity and body mass index (BMI) of 45.0 to 49.9 in adult (HCC) (Primary)  -     Liraglutide (Saxenda) 18 MG/3ML injection pen; Inject 3 mg under the skin into the appropriate area as " directed Daily.  Dispense: 18 mL; Refill: 5    2. Essential hypertension    3. Prediabetes  -     Liraglutide (Saxenda) 18 MG/3ML injection pen; Inject 3 mg under the skin into the appropriate area as directed Daily.  Dispense: 18 mL; Refill: 5       Patient seen today for follow up  Body mass index is 43.36 kg/m².  Steady weight loss over the last 6 months  Class 3 Severe Obesity (BMI >=40). Obesity-related health conditions include the following: hypertension and prediabetes. Obesity is improving with treatment. BMI is is above average; BMI management plan is completed. We discussed low calorie, low carb based diet program, portion control, increasing exercise and continuing saxenda.  Will consider additional weekly weight loss options with next visit  Hypertension controlled, continue current medication  Continue healthy diet and exercise      Follow Up {Instructions Charge Capture  Follow-up Communications :23}   Return in about 3 months (around 10/25/2022) for Recheck.  Patient was given instructions and counseling regarding her condition or for health maintenance advice. Please see specific information pulled into the AVS if appropriate.            This document has been electronically signed by Justina Arnold MD

## 2022-09-14 DIAGNOSIS — I10 ESSENTIAL HYPERTENSION: ICD-10-CM

## 2022-09-14 DIAGNOSIS — R73.03 PREDIABETES: ICD-10-CM

## 2022-09-14 RX ORDER — LISINOPRIL 10 MG/1
TABLET ORAL
Qty: 90 TABLET | Refills: 2 | Status: SHIPPED | OUTPATIENT
Start: 2022-09-14

## 2022-09-14 RX ORDER — METFORMIN HYDROCHLORIDE 500 MG/1
TABLET, EXTENDED RELEASE ORAL
Qty: 90 TABLET | Refills: 2 | Status: SHIPPED | OUTPATIENT
Start: 2022-09-14

## 2022-10-24 ENCOUNTER — OFFICE VISIT (OUTPATIENT)
Dept: FAMILY MEDICINE CLINIC | Facility: CLINIC | Age: 47
End: 2022-10-24

## 2022-10-24 VITALS
SYSTOLIC BLOOD PRESSURE: 126 MMHG | BODY MASS INDEX: 41.95 KG/M2 | HEIGHT: 70 IN | OXYGEN SATURATION: 96 % | WEIGHT: 293 LBS | DIASTOLIC BLOOD PRESSURE: 80 MMHG | HEART RATE: 84 BPM

## 2022-10-24 DIAGNOSIS — L91.8 INFLAMED SKIN TAG: ICD-10-CM

## 2022-10-24 DIAGNOSIS — E66.01 CLASS 3 SEVERE OBESITY WITH SERIOUS COMORBIDITY AND BODY MASS INDEX (BMI) OF 40.0 TO 44.9 IN ADULT, UNSPECIFIED OBESITY TYPE: Primary | ICD-10-CM

## 2022-10-24 DIAGNOSIS — R63.5 WEIGHT GAIN: ICD-10-CM

## 2022-10-24 DIAGNOSIS — R73.03 PREDIABETES: ICD-10-CM

## 2022-10-24 DIAGNOSIS — I10 ESSENTIAL HYPERTENSION: ICD-10-CM

## 2022-10-24 PROCEDURE — 99214 OFFICE O/P EST MOD 30 MIN: CPT | Performed by: FAMILY MEDICINE

## 2022-10-24 RX ORDER — SEMAGLUTIDE 0.25 MG/.5ML
0.25 INJECTION, SOLUTION SUBCUTANEOUS WEEKLY
Qty: 2 ML | Refills: 2 | Status: SHIPPED | OUTPATIENT
Start: 2022-10-24

## 2022-10-25 ENCOUNTER — LAB (OUTPATIENT)
Dept: LAB | Facility: OTHER | Age: 47
End: 2022-10-25

## 2022-10-25 DIAGNOSIS — R63.5 WEIGHT GAIN: ICD-10-CM

## 2022-10-25 DIAGNOSIS — I10 ESSENTIAL HYPERTENSION: ICD-10-CM

## 2022-10-25 DIAGNOSIS — R73.03 PREDIABETES: ICD-10-CM

## 2022-10-25 LAB
ALBUMIN SERPL-MCNC: 3.9 G/DL (ref 3.5–5)
ALBUMIN/GLOB SERPL: 1.2 G/DL (ref 1.1–1.8)
ALP SERPL-CCNC: 56 U/L (ref 38–126)
ALT SERPL W P-5'-P-CCNC: 26 U/L
ANION GAP SERPL CALCULATED.3IONS-SCNC: 5 MMOL/L (ref 5–15)
AST SERPL-CCNC: 23 U/L (ref 14–36)
BASOPHILS # BLD AUTO: 0.02 10*3/MM3 (ref 0–0.2)
BASOPHILS NFR BLD AUTO: 0.3 % (ref 0–1.5)
BILIRUB SERPL-MCNC: 0.4 MG/DL (ref 0.2–1.3)
BUN SERPL-MCNC: 14 MG/DL (ref 7–23)
BUN/CREAT SERPL: 20.9 (ref 7–25)
CALCIUM SPEC-SCNC: 8.8 MG/DL (ref 8.4–10.2)
CHLORIDE SERPL-SCNC: 105 MMOL/L (ref 101–112)
CO2 SERPL-SCNC: 26 MMOL/L (ref 22–30)
CREAT SERPL-MCNC: 0.67 MG/DL (ref 0.52–1.04)
DEPRECATED RDW RBC AUTO: 46.5 FL (ref 37–54)
EGFRCR SERPLBLD CKD-EPI 2021: 109.3 ML/MIN/1.73
EOSINOPHIL # BLD AUTO: 0.23 10*3/MM3 (ref 0–0.4)
EOSINOPHIL NFR BLD AUTO: 3.4 % (ref 0.3–6.2)
ERYTHROCYTE [DISTWIDTH] IN BLOOD BY AUTOMATED COUNT: 13.9 % (ref 12.3–15.4)
GLOBULIN UR ELPH-MCNC: 3.2 GM/DL (ref 2.3–3.5)
GLUCOSE SERPL-MCNC: 115 MG/DL (ref 70–99)
HBA1C MFR BLD: 5.5 % (ref 4.8–5.6)
HCT VFR BLD AUTO: 36.7 % (ref 34–46.6)
HGB BLD-MCNC: 11.4 G/DL (ref 12–15.9)
LYMPHOCYTES # BLD AUTO: 1.25 10*3/MM3 (ref 0.7–3.1)
LYMPHOCYTES NFR BLD AUTO: 18.5 % (ref 19.6–45.3)
MCH RBC QN AUTO: 29.1 PG (ref 26.6–33)
MCHC RBC AUTO-ENTMCNC: 31.1 G/DL (ref 31.5–35.7)
MCV RBC AUTO: 93.6 FL (ref 79–97)
MONOCYTES # BLD AUTO: 0.47 10*3/MM3 (ref 0.1–0.9)
MONOCYTES NFR BLD AUTO: 7 % (ref 5–12)
NEUTROPHILS NFR BLD AUTO: 4.78 10*3/MM3 (ref 1.7–7)
NEUTROPHILS NFR BLD AUTO: 70.8 % (ref 42.7–76)
PLATELET # BLD AUTO: 290 10*3/MM3 (ref 140–450)
PMV BLD AUTO: 10 FL (ref 6–12)
POTASSIUM SERPL-SCNC: 3.9 MMOL/L (ref 3.4–5)
PROT SERPL-MCNC: 7.1 G/DL (ref 6.3–8.6)
RBC # BLD AUTO: 3.92 10*6/MM3 (ref 3.77–5.28)
SODIUM SERPL-SCNC: 136 MMOL/L (ref 137–145)
TSH SERPL DL<=0.05 MIU/L-ACNC: 1.38 UIU/ML (ref 0.27–4.2)
WBC NRBC COR # BLD: 6.75 10*3/MM3 (ref 3.4–10.8)

## 2022-10-25 PROCEDURE — 83036 HEMOGLOBIN GLYCOSYLATED A1C: CPT | Performed by: FAMILY MEDICINE

## 2022-10-25 PROCEDURE — 36415 COLL VENOUS BLD VENIPUNCTURE: CPT | Performed by: FAMILY MEDICINE

## 2022-10-25 PROCEDURE — 80050 GENERAL HEALTH PANEL: CPT | Performed by: FAMILY MEDICINE

## 2022-10-25 PROCEDURE — 82627 DEHYDROEPIANDROSTERONE: CPT | Performed by: FAMILY MEDICINE

## 2022-10-26 LAB — DHEA-S SERPL-MCNC: 43.3 UG/DL (ref 41.2–243.7)

## 2022-10-27 ENCOUNTER — TELEPHONE (OUTPATIENT)
Dept: FAMILY MEDICINE CLINIC | Facility: CLINIC | Age: 47
End: 2022-10-27

## 2022-10-27 NOTE — TELEPHONE ENCOUNTER
Per Dr. Arnold, Ms. Ayala has been called with recent lab results & recommendations.  Continue current medications and follow-up as planned or sooner if any problems.       ----- Message from Justina Arnold MD sent at 10/26/2022  5:26 PM CDT -----  Labs ok.  Mild anemia, will need continued monitoring.  HgbA1c good at 5.5%.  Thanks, HUMAIRA Arnold

## 2022-10-27 NOTE — PROGRESS NOTES
Per Dr. Arnold, Ms. Ayala has been called with recent lab results & recommendations.  Continue current medications and follow-up as planned or sooner if any problems.

## 2022-10-31 ENCOUNTER — OFFICE VISIT (OUTPATIENT)
Dept: SURGERY | Facility: CLINIC | Age: 47
End: 2022-10-31

## 2022-10-31 VITALS
HEIGHT: 70 IN | BODY MASS INDEX: 41.95 KG/M2 | WEIGHT: 293 LBS | DIASTOLIC BLOOD PRESSURE: 74 MMHG | SYSTOLIC BLOOD PRESSURE: 128 MMHG | HEART RATE: 73 BPM | TEMPERATURE: 97.8 F

## 2022-10-31 DIAGNOSIS — L91.8 SKIN TAG: Primary | ICD-10-CM

## 2022-10-31 PROCEDURE — 99212 OFFICE O/P EST SF 10 MIN: CPT | Performed by: NURSE PRACTITIONER

## 2022-10-31 NOTE — PROGRESS NOTES
Subjective   Marlee Ayala is a 47 y.o. female     Chief Complaint: skin tag on right arm    History of Present Illness   Ms. Marlee Ayala is a 47 year old patient who presents today for concerns of skin tag on right arm.  She states she has had this for approximately 30 years but it is becoming increasingly irritating as it frequently gets caught on her clothes and will occasionally bleed.  She states she has several skin tags and has had excision of several similar lesions in the past.  Denies any prior complications with local anesthetic.  Denies taking any anticoagulant medication.        Review of Systems   Constitutional: Negative.    HENT: Negative.    Eyes:        Vision Change   Respiratory: Negative.    Cardiovascular: Negative.    Gastrointestinal: Negative.    Endocrine: Negative.    Genitourinary: Negative.    Musculoskeletal: Negative.    Allergic/Immunologic: Negative.    Neurological: Negative.    Hematological: Bruises/bleeds easily.   Psychiatric/Behavioral: Negative.      Past Medical History:   Diagnosis Date   • Abdominal pain     unspecified   • Cholelithiasis    • Family history of blood clots     Maternal    • Gallbladder problem     Gallbladder surgery removal     • Heartburn    • History of Papanicolaou smear of cervix 2005    postpartum satisfactory   • Hypertension    • Obesity    • Seasonal allergies      Past Surgical History:   Procedure Laterality Date   • CHOLECYSTECTOMY     • DILATATION AND CURETTAGE  2000    incomplete      Family History   Problem Relation Age of Onset   • Diabetes Father    • Stroke Father    • Hypertension Brother    • Prostate cancer Maternal Grandfather      Social History     Socioeconomic History   • Marital status:    Tobacco Use   • Smoking status: Never   • Smokeless tobacco: Never   Vaping Use   • Vaping Use: Never used   Substance and Sexual Activity   • Alcohol use: No   • Drug use: No   • Sexual activity: Yes     No  "Known Allergies  Vitals:    10/31/22 1521   BP: 128/74   Pulse: 73   Temp: 97.8 °F (36.6 °C)       Home Medications:  Prior to Admission medications    Medication Sig Start Date End Date Taking? Authorizing Provider   atenolol (TENORMIN) 50 MG tablet TAKE 1 TABLET EVERY DAY 6/14/22  Yes Justina Arnold MD B-TOY UF III MINI PEN NEEDLES 31G X 5 MM misc  3/5/22  Yes ProviderBrenda MD   Insulin Pen Needle (Pen Needles 5/16\") 31G X 8 MM misc Use one daily with Saxenda Injections 6/7/22  Yes Justina Arnold MD   Liraglutide (Saxenda) 18 MG/3ML injection pen Inject 3 mg under the skin into the appropriate area as directed Daily. 7/25/22  Yes Justina Arnold MD   lisinopril (PRINIVIL,ZESTRIL) 10 MG tablet TAKE 1 TABLET EVERY DAY 9/14/22  Yes Justina Arnold MD   metFORMIN ER (GLUCOPHAGE-XR) 500 MG 24 hr tablet TAKE 1 TABLET EVERY DAY WITH BREAKFAST 9/14/22  Yes Justina Arnold MD   albuterol sulfate  (90 Base) MCG/ACT inhaler Inhale 2 puffs Every 4 (Four) Hours As Needed for Wheezing (cough). 9/25/22   Breann Lunsford APRN   cetirizine (zyrTEC) 10 MG tablet Take 1 tablet by mouth Daily. 7/27/21   Justina Arnold MD   Semaglutide-Weight Management (Wegovy) 0.25 MG/0.5ML solution auto-injector Inject 0.25 mg under the skin into the appropriate area as directed 1 (One) Time Per Week. 10/24/22   Justina Arnold MD       Objective   Physical Exam  Vitals reviewed.   Constitutional:       General: She is not in acute distress.     Appearance: Normal appearance. She is not ill-appearing, toxic-appearing or diaphoretic.   Cardiovascular:      Rate and Rhythm: Normal rate.   Pulmonary:      Effort: Pulmonary effort is normal. No respiratory distress.   Skin:     General: Skin is warm and dry.      Findings: Lesion present.          Neurological:      Mental Status: She is alert.   Psychiatric:         Mood and Affect: Mood normal.         Behavior: Behavior normal.         Thought Content: " Thought content normal.         Judgment: Judgment normal.         Assessment & Plan       The encounter diagnosis was Skin tag.  1.  Plan for in office excision of skin lesion at patient's convenience.                     This document has been electronically signed by TRISH Dowling on October 31, 2022 15:50 CDT

## 2022-11-08 ENCOUNTER — PROCEDURE VISIT (OUTPATIENT)
Dept: SURGERY | Facility: CLINIC | Age: 47
End: 2022-11-08

## 2022-11-08 VITALS
HEART RATE: 89 BPM | TEMPERATURE: 98.8 F | HEIGHT: 70 IN | DIASTOLIC BLOOD PRESSURE: 84 MMHG | SYSTOLIC BLOOD PRESSURE: 132 MMHG | OXYGEN SATURATION: 98 % | WEIGHT: 293 LBS | BODY MASS INDEX: 41.95 KG/M2

## 2022-11-08 DIAGNOSIS — D22.9 NEVUS: Primary | ICD-10-CM

## 2022-11-08 PROCEDURE — 11401 EXC TR-EXT B9+MARG 0.6-1 CM: CPT | Performed by: NURSE PRACTITIONER

## 2022-11-08 NOTE — PROGRESS NOTES
"Chief Complaint  Follow-up (Skin tag on Right Arm)    Subjective        Marlee Ayala presents to [facility]     History of Present Illness  Ms. Marlee Ayala  presents today for excision of nevus/skin tag on right arm.  She states she has had this for approximately 30 years but it is becoming increasingly irritating as it frequently gets caught on her clothes and will occasionally bleed.  She states she has several skin tags and has had excision of several similar lesions in the past.  Denies taking any anticoagulant medication.     Objective   Vital Signs:  /84 (BP Location: Left arm, Patient Position: Sitting, Cuff Size: Adult) Comment (BP Location): Left lower arm  Pulse 89   Temp 98.8 °F (37.1 °C) (Temporal)   Ht 177.8 cm (70\")   Wt (!) 138 kg (304 lb 3.2 oz)   SpO2 98%   BMI 43.65 kg/m²   Estimated body mass index is 43.65 kg/m² as calculated from the following:    Height as of this encounter: 177.8 cm (70\").    Weight as of this encounter: 138 kg (304 lb 3.2 oz).          Physical Exam  Vitals reviewed.   Constitutional:       General: She is not in acute distress.     Appearance: Normal appearance. She is not ill-appearing, toxic-appearing or diaphoretic.   Cardiovascular:      Rate and Rhythm: Normal rate.   Pulmonary:      Effort: Pulmonary effort is normal. No respiratory distress.   Skin:     General: Skin is warm and dry.      Findings: Lesion present.          Neurological:      Mental Status: She is alert.   Psychiatric:         Mood and Affect: Mood normal.         Behavior: Behavior normal.         Thought Content: Thought content normal.         Judgment: Judgment normal.        Procedure     Procedure Performed: Removal of 1cm nevus from right arm requiring 1cm incision length  Preop Dx: nevus  Postop Dx: Same  Assistant: Madelaine Romeo MA  EBL: <2cc  Specimen: Skin Lesion    Note: Site was identified by the patient.  Consent was obtained.  " Timeout was performed.  The area was prepped and draped in normal sterile fashion.  Local anesthetic was then injected in a field block.  Lesion was grasped and nevus was excised even with skin surface. Hemostasis was achieved with pressure and silver nitrate.  This site was then left open. Sterile bandage was placed.  Patient tolerated well.      Result Review :                Assessment and Plan   Diagnoses and all orders for this visit:    1. Nevus (Primary)  -     TISSUE EXAM, [facility] ([facility])           I spent 35 minutes caring for Marlee on this date of service. This time includes time spent by me in the following activities:preparing for the visit, obtaining and/or reviewing a separately obtained history, performing a medically appropriate examination and/or evaluation , documenting information in the medical record, care coordination and in office procedure  Follow Up   Return in about 1 week (around 11/15/2022), or if symptoms worsen or fail to improve.     May clean incision site in shower beginning in 24 hours.  She is aware of signs and symptoms that require urgent medical attention.  Recheck in 1 week or sooner if concerns arise.    Patient was given instructions and counseling regarding her condition or for health maintenance advice. Please see specific information pulled into the AVS if appropriate.                 This document has been electronically signed by TRISH Dowling on November 8, 2022 15:41 CST

## 2022-11-11 LAB — REF LAB TEST METHOD: NORMAL

## 2022-11-15 ENCOUNTER — OFFICE VISIT (OUTPATIENT)
Dept: SURGERY | Facility: CLINIC | Age: 47
End: 2022-11-15

## 2022-11-15 VITALS
BODY MASS INDEX: 41.95 KG/M2 | HEIGHT: 70 IN | HEART RATE: 79 BPM | OXYGEN SATURATION: 99 % | SYSTOLIC BLOOD PRESSURE: 123 MMHG | WEIGHT: 293 LBS | TEMPERATURE: 97.5 F | DIASTOLIC BLOOD PRESSURE: 84 MMHG

## 2022-11-15 DIAGNOSIS — L82.1 SEBORRHEIC KERATOSIS: Primary | ICD-10-CM

## 2022-11-15 PROCEDURE — 99024 POSTOP FOLLOW-UP VISIT: CPT | Performed by: NURSE PRACTITIONER

## 2022-11-15 NOTE — PROGRESS NOTES
CHIEF COMPLAINT:   Chief Complaint   Patient presents with   • Follow-up     Follow-up from office procedure       HPI: This patient presents for a post-operative visit after undergoing excision of skin lesion from right arm. Doing well- no cellulitis, wound separation, or significant pain. Denies fever or chills.      PATHOLOGY:         Physical Exam  Vitals reviewed.   Constitutional:       General: She is not in acute distress.     Appearance: Normal appearance. She is not ill-appearing, toxic-appearing or diaphoretic.   Cardiovascular:      Rate and Rhythm: Normal rate.   Pulmonary:      Effort: Pulmonary effort is normal. No respiratory distress.   Skin:     General: Skin is warm and dry.      Findings: Lesion present.          Neurological:      Mental Status: She is alert.   Psychiatric:         Mood and Affect: Mood normal.         Behavior: Behavior normal.         Thought Content: Thought content normal.         Judgment: Judgment normal.         ASSESSMENT:  Diagnoses and all orders for this visit:    1. Seborrheic keratosis (Primary)        PLAN:  1. Recheck on PRN basis. May leave incision site open to air.                      This document has been electronically signed by TRISH Dowling on November 15, 2022 15:54 CST

## 2022-12-15 DIAGNOSIS — I10 ESSENTIAL HYPERTENSION: ICD-10-CM

## 2022-12-15 RX ORDER — ATENOLOL 50 MG/1
TABLET ORAL
Qty: 90 TABLET | Refills: 1 | Status: SHIPPED | OUTPATIENT
Start: 2022-12-15

## 2023-01-25 ENCOUNTER — PATIENT MESSAGE (OUTPATIENT)
Dept: FAMILY MEDICINE CLINIC | Facility: CLINIC | Age: 48
End: 2023-01-25

## 2023-01-25 DIAGNOSIS — F40.243 ANXIETY WITH FLYING: Primary | ICD-10-CM

## 2023-01-27 RX ORDER — LORAZEPAM 0.5 MG/1
0.5 TABLET ORAL DAILY PRN
Qty: 4 TABLET | Refills: 0 | OUTPATIENT
Start: 2023-01-27 | End: 2023-03-09

## 2023-04-21 DIAGNOSIS — E66.01 CLASS 3 SEVERE OBESITY WITH SERIOUS COMORBIDITY AND BODY MASS INDEX (BMI) OF 40.0 TO 44.9 IN ADULT, UNSPECIFIED OBESITY TYPE: ICD-10-CM

## 2023-04-21 RX ORDER — SEMAGLUTIDE 0.25 MG/.5ML
0.25 INJECTION, SOLUTION SUBCUTANEOUS WEEKLY
Qty: 2 ML | Refills: 2 | Status: CANCELLED | OUTPATIENT
Start: 2023-04-21

## 2023-05-24 ENCOUNTER — TELEPHONE (OUTPATIENT)
Dept: FAMILY MEDICINE CLINIC | Facility: CLINIC | Age: 48
End: 2023-05-24
Payer: COMMERCIAL

## 2023-05-24 ENCOUNTER — PATIENT MESSAGE (OUTPATIENT)
Dept: FAMILY MEDICINE CLINIC | Facility: CLINIC | Age: 48
End: 2023-05-24
Payer: COMMERCIAL

## 2023-05-24 NOTE — TELEPHONE ENCOUNTER
Incoming Refill Request      Medication requested (name and dose): Wegovy  The pharmacies  do not have .5 or 1.0 so needs to know what to do or where she can get it . Needs next dose on Tuesday May 30  Pharmacy where request should be sent:   [facility] but will go to anyone     Additional details provided by patient: and if she finds 1.0 could we send that dose in as she just finished the .5    Best call back number:  [phone]    Does the patient have less than a 3 day supply:  [] Yes  [] No    Juan Antonio Maher Rep  05/24/23, 10:37 CDT

## 2023-06-15 ENCOUNTER — PATIENT MESSAGE (OUTPATIENT)
Dept: FAMILY MEDICINE CLINIC | Facility: CLINIC | Age: 48
End: 2023-06-15
Payer: COMMERCIAL

## 2023-07-25 ENCOUNTER — TELEPHONE (OUTPATIENT)
Dept: FAMILY MEDICINE CLINIC | Facility: CLINIC | Age: 48
End: 2023-07-25
Payer: COMMERCIAL

## 2023-07-25 DIAGNOSIS — E66.01 CLASS 3 SEVERE OBESITY WITH SERIOUS COMORBIDITY AND BODY MASS INDEX (BMI) OF 40.0 TO 44.9 IN ADULT, UNSPECIFIED OBESITY TYPE: Primary | ICD-10-CM

## 2023-07-25 NOTE — TELEPHONE ENCOUNTER
Pt says cannot get Wegovy 1.0 and she has finished 1 mo supply and wants to bump up to 1.7 if possible and is available at her pharmacy at the 1.7 dosage     [facility]   Pt [phone]

## 2023-09-14 ENCOUNTER — TELEPHONE (OUTPATIENT)
Dept: FAMILY MEDICINE CLINIC | Facility: CLINIC | Age: 48
End: 2023-09-14
Payer: COMMERCIAL

## 2023-09-14 NOTE — TELEPHONE ENCOUNTER
-Per Dr. Arnold, Ms. Ayala has been called with recent Cologuard Colorectal Cancer Screen results & recommendations.  Continue current medications and follow-up as planned or sooner if any problems.       ---- Message from Justina Arnold MD sent at 9/12/2023  9:11 PM CDT -----  Cologuard negative.  Thanks, HUMAIRA Arnold